# Patient Record
Sex: FEMALE | Race: WHITE | NOT HISPANIC OR LATINO | Employment: OTHER | URBAN - METROPOLITAN AREA
[De-identification: names, ages, dates, MRNs, and addresses within clinical notes are randomized per-mention and may not be internally consistent; named-entity substitution may affect disease eponyms.]

---

## 2017-03-06 ENCOUNTER — OFFICE VISIT (OUTPATIENT)
Dept: FAMILY MEDICINE | Facility: CLINIC | Age: 72
End: 2017-03-06
Payer: MEDICARE

## 2017-03-06 VITALS
TEMPERATURE: 97.3 F | WEIGHT: 130.8 LBS | SYSTOLIC BLOOD PRESSURE: 130 MMHG | HEIGHT: 62 IN | HEART RATE: 72 BPM | BODY MASS INDEX: 24.07 KG/M2 | DIASTOLIC BLOOD PRESSURE: 78 MMHG

## 2017-03-06 DIAGNOSIS — F32.5 MAJOR DEPRESSION IN COMPLETE REMISSION (H): Primary | ICD-10-CM

## 2017-03-06 DIAGNOSIS — Z71.89 COUNSELING ON HEALTH CARE DIRECTIVE: ICD-10-CM

## 2017-03-06 DIAGNOSIS — H91.90 HEARING LOSS, UNSPECIFIED LATERALITY: ICD-10-CM

## 2017-03-06 PROCEDURE — 99214 OFFICE O/P EST MOD 30 MIN: CPT | Performed by: PHYSICIAN ASSISTANT

## 2017-03-06 RX ORDER — ESCITALOPRAM OXALATE 10 MG/1
10 TABLET ORAL DAILY
Qty: 90 TABLET | Refills: 3 | Status: SHIPPED | OUTPATIENT
Start: 2017-03-06 | End: 2017-05-09

## 2017-03-06 ASSESSMENT — ANXIETY QUESTIONNAIRES
5. BEING SO RESTLESS THAT IT IS HARD TO SIT STILL: NOT AT ALL
GAD7 TOTAL SCORE: 0
2. NOT BEING ABLE TO STOP OR CONTROL WORRYING: NOT AT ALL
7. FEELING AFRAID AS IF SOMETHING AWFUL MIGHT HAPPEN: NOT AT ALL
1. FEELING NERVOUS, ANXIOUS, OR ON EDGE: NOT AT ALL
6. BECOMING EASILY ANNOYED OR IRRITABLE: NOT AT ALL
3. WORRYING TOO MUCH ABOUT DIFFERENT THINGS: NOT AT ALL

## 2017-03-06 ASSESSMENT — PATIENT HEALTH QUESTIONNAIRE - PHQ9: 5. POOR APPETITE OR OVEREATING: NOT AT ALL

## 2017-03-06 NOTE — PROGRESS NOTES
"  SUBJECTIVE:                                                    Isabella Acosta is a 72 year old female who presents to clinic today for the following health issues:      Depression Followup    Status since last visit: Stable     See PHQ-9 for current symptoms.  Other associated symptoms: None    Complicating factors:   Significant life event:  Yes-  Niece  of cancer, just had an abscessed tooth pulled   Current substance abuse:  None  Anxiety or Panic symptoms:  No    PHQ-9  English PHQ-9   Any Language            Amount of exercise or physical activity: 2-3 days/week for an average of 15-30 minutes    Problems taking medications regularly: No    Medication side effects: none  Diet: regular (no restrictions)    On lexapro 20 mg for years, other SSRI prior.  Has tried off med in past and does not ever want to try going off med again.    Still does not want any cancer or preventative health screenings.  Intolerant of asa.    Is frustrated by poor hearing x 6 mo or so.    Problem list and histories reviewed & adjusted, as indicated.  Additional history: as documented    BP Readings from Last 3 Encounters:   17 130/78   12/01/15 136/70   11/02/15 146/79    Wt Readings from Last 3 Encounters:   17 130 lb 12.8 oz (59.3 kg)   12/01/15 129 lb (58.5 kg)   09/22/15 130 lb 9.6 oz (59.2 kg)            Labs reviewed in EPIC    Reviewed and updated as needed this visit by clinical staff       Reviewed and updated as needed this visit by Provider         ROS:  Constitutional, HEENT, psych systems are negative, except as otherwise noted.    OBJECTIVE:                                                    /78 (BP Location: Right arm, Patient Position: Chair, Cuff Size: Adult Regular)  Pulse 72  Temp 97.3  F (36.3  C) (Tympanic)  Ht 5' 2.25\" (1.581 m)  Wt 130 lb 12.8 oz (59.3 kg)  BMI 23.73 kg/m2  Body mass index is 23.73 kg/(m^2).  GENERAL: healthy, alert and no distress  PSYCH: mentation appears normal, affect " normal/bright  HEENT: TMs and canals normal liv    PHQ-9 SCORE 4/3/2014 9/22/2015 3/6/2017   Total Score 4 - -   Total Score - 3 0     MARYCRUZ-7 SCORE 4/3/2014 9/22/2015 3/6/2017   Total Score 0 - -   Total Score - 0 0        ASSESSMENT/PLAN:                                                        ICD-10-CM    1. Major depression in complete remission (H) F32.5 escitalopram (LEXAPRO) 10 MG tablet   2. Counseling on health care directive Z71.89    3. Hearing loss, unspecified laterality H91.90 AUDIOLOGY ADULT REFERRAL       Patient Instructions   Try dose decrease - more appropriate for age    Attend class to set up health directive    Hearing referral      Ssuana aTylor PA-C  [facility]

## 2017-03-06 NOTE — MR AVS SNAPSHOT
"              After Visit Summary   3/6/2017    Isabella Acosta    MRN: [id number]           Patient Information     Date Of Birth          1945        Visit Information        Provider Department      3/6/2017 1:00 PM Susana Taylor PA-C [facility]        Today's Diagnoses     Major depression in complete remission (H)    -  1    Counseling on health care directive        Hearing loss, unspecified laterality          Care Instructions    Try dose decrease - more appropriate for age    Attend class to set up health directive    Hearing referral        Follow-ups after your visit        Additional Services     AUDIOLOGY ADULT REFERRAL       Your provider has referred you to: [facility] [phone]   [email or web address]    Specialty Testing:  eval and treat for hearing loss                  Who to contact     If you have questions or need follow up information about today's clinic visit or your schedule please contact [facility] directly at [phone].  Normal or non-critical lab and imaging results will be communicated to you by [employer], letter or phone within 4 business days after the clinic has received the results. If you do not hear from us within 7 days, please contact the clinic through [employer] or phone. If you have a critical or abnormal lab result, we will notify you by phone as soon as possible.  Submit refill requests through [employer] or call your pharmacy and they will forward the refill request to us. Please allow 3 business days for your refill to be completed.          Additional Information About Your Visit        MyChart Information     [employer] lets you send messages to your doctor, view your test results, renew your prescriptions, schedule appointments and more. To sign up, go to [email or web address] . Click on \"Log in\" on the left side of the screen, which will take you to the Welcome " "page. Then click on \"Sign up Now\" on the right side of the page.     You will be asked to enter the access code listed below, as well as some personal information. Please follow the directions to create your username and password.     Your access code is: [email or web address]  Expires: 2017  1:37 PM     Your access code will  in 90 days. If you need help or a new code, please call your [facility] clinic or [phone].        Care EveryWhere ID     This is your Care EveryWhere ID. This could be used by other organizations to access your [facility] medical records  [id number]        Your Vitals Were     Pulse Temperature Height BMI (Body Mass Index)          72 97.3  F (36.3  C) (Tympanic) 5' 2.25\" (1.581 m) 23.73 kg/m2         Blood Pressure from Last 3 Encounters:   17 130/78   12/01/15 136/70   11/02/15 146/79    Weight from Last 3 Encounters:   17 130 lb 12.8 oz (59.3 kg)   12/01/15 129 lb (58.5 kg)   09/22/15 130 lb 9.6 oz (59.2 kg)              We Performed the Following     AUDIOLOGY ADULT REFERRAL          Today's Medication Changes          These changes are accurate as of: 3/6/17  1:37 PM.  If you have any questions, ask your nurse or doctor.               These medicines have changed or have updated prescriptions.        Dose/Directions    escitalopram 10 MG tablet   Commonly known as:  LEXAPRO   This may have changed:    - medication strength  - how much to take   Used for:  Major depression in complete remission (H)   Changed by:  Susana Taylor PA-C        Dose:  10 mg   Take 1 tablet (10 mg) by mouth daily   Quantity:  90 tablet   Refills:  3            Where to get your medicines      These medications were sent to [id number] - [place] [address]  [address] [address]    Hours:  Closed 10-16-08 business to [facility] Phone:  [phone]     escitalopram 10 MG tablet                Primary Care Provider Office Phone # Fax #    " Lydia Ramso -[phone]       [facility] [address] [facility] [address]        Thank you!     Thank you for choosing [facility]  for your care. Our goal is always to provide you with excellent care. Hearing back from our patients is one way we can continue to improve our services. Please take a few minutes to complete the written survey that you may receive in the mail after your visit with us. Thank you!             Your Updated Medication List - Protect others around you: Learn how to safely use, store and throw away your medicines at www.disposemymeds.org.          This list is accurate as of: 3/6/17  1:37 PM.  Always use your most recent med list.                   Brand Name Dispense Instructions for use    escitalopram 10 MG tablet    LEXAPRO    90 tablet    Take 1 tablet (10 mg) by mouth daily       melatonin 3 MG tablet      Take 6 mg by mouth nightly as needed for sleep       PRILOSEC OTC PO      1 daily

## 2017-03-06 NOTE — PATIENT INSTRUCTIONS
Try dose decrease - more appropriate for age    Attend class to set up health directive    Hearing referral

## 2017-03-06 NOTE — NURSING NOTE
"Chief Complaint   Patient presents with     Depression       Initial /78 (BP Location: Right arm, Patient Position: Chair, Cuff Size: Adult Regular)  Pulse 72  Temp 97.3  F (36.3  C) (Tympanic)  Ht 5' 2.25\" (1.581 m)  Wt 130 lb 12.8 oz (59.3 kg)  BMI 23.73 kg/m2 Estimated body mass index is 23.73 kg/(m^2) as calculated from the following:    Height as of this encounter: 5' 2.25\" (1.581 m).    Weight as of this encounter: 130 lb 12.8 oz (59.3 kg).  Medication Reconciliation: complete    Health Maintenance that is potentially due pending provider review:  Mammogram    Luiza HUBBARD MA        "

## 2017-03-07 ASSESSMENT — PATIENT HEALTH QUESTIONNAIRE - PHQ9: SUM OF ALL RESPONSES TO PHQ QUESTIONS 1-9: 0

## 2017-03-07 ASSESSMENT — ANXIETY QUESTIONNAIRES: GAD7 TOTAL SCORE: 0

## 2017-05-09 ENCOUNTER — TELEPHONE (OUTPATIENT)
Dept: FAMILY MEDICINE | Facility: CLINIC | Age: 72
End: 2017-05-09

## 2017-05-09 DIAGNOSIS — F32.1 MAJOR DEPRESSIVE DISORDER, SINGLE EPISODE, MODERATE (H): ICD-10-CM

## 2017-05-09 DIAGNOSIS — F32.5 MAJOR DEPRESSION IN COMPLETE REMISSION (H): ICD-10-CM

## 2017-05-09 RX ORDER — ESCITALOPRAM OXALATE 10 MG/1
10 TABLET ORAL DAILY
Qty: 90 TABLET | Refills: 0 | Status: SHIPPED | OUTPATIENT
Start: 2017-05-09 | End: 2017-05-10 | Stop reason: DRUGHIGH

## 2017-05-09 NOTE — TELEPHONE ENCOUNTER
Lexapro       Last Written Prescription Date: 3/6/17  Last Fill Quantity: 90; # refills: 3  Last Office Visit with G, P or [facility] prescribing provider:  3/6/17          Last PHQ-9 score on record=   PHQ-9 SCORE 3/6/2017   Total Score -   Total Score 0       No results found for: AST  Lab Results   Component Value Date    ALT 19 12/28/2009   Patient is calling to see if Susana WARRENTasia Will up her dose to 20 mg, which is what she was before, she lowered it. She can definitely tell the difference. She uses [facility] in [place].  [facility]  [facility] [place] [facility]

## 2017-05-10 RX ORDER — ESCITALOPRAM OXALATE 20 MG/1
20 TABLET ORAL DAILY
Qty: 30 TABLET | Refills: 0 | Status: SHIPPED | OUTPATIENT
Start: 2017-05-10 | End: 2017-06-01

## 2017-05-10 NOTE — TELEPHONE ENCOUNTER
Official labeling on this med is for 10 mg in the over age 65 population.    It also has some interactions with omeprazole, which increases risk for lexapro to stop her heart (long QT).  If she still really wants to go back on the higher dose, then needs an EKG within 1-2 weeks of restarting higher dose.  Can make an appt for that.  Send in 1 mo higher dose if she wants back on it.  Further refills after EKG.

## 2017-06-01 ENCOUNTER — ALLIED HEALTH/NURSE VISIT (OUTPATIENT)
Dept: FAMILY MEDICINE | Facility: CLINIC | Age: 72
End: 2017-06-01
Payer: MEDICARE

## 2017-06-01 DIAGNOSIS — F32.1 MAJOR DEPRESSIVE DISORDER, SINGLE EPISODE, MODERATE (H): ICD-10-CM

## 2017-06-01 DIAGNOSIS — F32.5 MAJOR DEPRESSION IN COMPLETE REMISSION (H): ICD-10-CM

## 2017-06-01 DIAGNOSIS — F32.1 MODERATE MAJOR DEPRESSION (H): Primary | ICD-10-CM

## 2017-06-01 PROCEDURE — 99207 ZZC NO CHARGE NURSE ONLY: CPT

## 2017-06-01 PROCEDURE — 93000 ELECTROCARDIOGRAM COMPLETE: CPT

## 2017-06-01 RX ORDER — ESCITALOPRAM OXALATE 20 MG/1
20 TABLET ORAL DAILY
Qty: 30 TABLET | Refills: 5 | Status: SHIPPED | OUTPATIENT
Start: 2017-06-01 | End: 2017-12-12

## 2017-12-12 DIAGNOSIS — F32.5 MAJOR DEPRESSION IN COMPLETE REMISSION (H): ICD-10-CM

## 2017-12-12 DIAGNOSIS — F32.1 MAJOR DEPRESSIVE DISORDER, SINGLE EPISODE, MODERATE (H): ICD-10-CM

## 2017-12-13 RX ORDER — ESCITALOPRAM OXALATE 20 MG/1
TABLET ORAL
Qty: 30 TABLET | Refills: 0 | Status: SHIPPED | OUTPATIENT
Start: 2017-12-13 | End: 2018-01-11

## 2018-01-11 ENCOUNTER — OFFICE VISIT (OUTPATIENT)
Dept: FAMILY MEDICINE | Facility: CLINIC | Age: 73
End: 2018-01-11
Payer: MEDICARE

## 2018-01-11 VITALS
BODY MASS INDEX: 24.29 KG/M2 | HEIGHT: 62 IN | TEMPERATURE: 97.7 F | WEIGHT: 132 LBS | DIASTOLIC BLOOD PRESSURE: 82 MMHG | HEART RATE: 72 BPM | SYSTOLIC BLOOD PRESSURE: 138 MMHG

## 2018-01-11 DIAGNOSIS — F33.0 MILD RECURRENT MAJOR DEPRESSION (H): ICD-10-CM

## 2018-01-11 DIAGNOSIS — Z78.9 HEALTH MAINTENANCE ALTERATION: Primary | ICD-10-CM

## 2018-01-11 PROCEDURE — 99213 OFFICE O/P EST LOW 20 MIN: CPT | Performed by: PHYSICIAN ASSISTANT

## 2018-01-11 RX ORDER — ESCITALOPRAM OXALATE 20 MG/1
20 TABLET ORAL DAILY
Qty: 90 TABLET | Refills: 3 | Status: SHIPPED | OUTPATIENT
Start: 2018-01-11 | End: 2019-01-14

## 2018-01-11 ASSESSMENT — ANXIETY QUESTIONNAIRES
2. NOT BEING ABLE TO STOP OR CONTROL WORRYING: SEVERAL DAYS
GAD7 TOTAL SCORE: 7
GAD7 TOTAL SCORE: 7
5. BEING SO RESTLESS THAT IT IS HARD TO SIT STILL: SEVERAL DAYS
6. BECOMING EASILY ANNOYED OR IRRITABLE: SEVERAL DAYS
GAD7 TOTAL SCORE: 7
1. FEELING NERVOUS, ANXIOUS, OR ON EDGE: SEVERAL DAYS
4. TROUBLE RELAXING: SEVERAL DAYS
7. FEELING AFRAID AS IF SOMETHING AWFUL MIGHT HAPPEN: SEVERAL DAYS
7. FEELING AFRAID AS IF SOMETHING AWFUL MIGHT HAPPEN: SEVERAL DAYS
3. WORRYING TOO MUCH ABOUT DIFFERENT THINGS: SEVERAL DAYS

## 2018-01-11 ASSESSMENT — PATIENT HEALTH QUESTIONNAIRE - PHQ9
SUM OF ALL RESPONSES TO PHQ QUESTIONS 1-9: 9
SUM OF ALL RESPONSES TO PHQ QUESTIONS 1-9: 9

## 2018-01-11 NOTE — NURSING NOTE
"Chief Complaint   Patient presents with     Depression       Initial /76 (BP Location: Right arm, Patient Position: Chair, Cuff Size: Adult Regular)  Pulse 72  Temp 97.7  F (36.5  C) (Tympanic)  Ht 5' 2.25\" (1.581 m)  Wt 132 lb (59.9 kg)  BMI 23.95 kg/m2 Estimated body mass index is 23.95 kg/(m^2) as calculated from the following:    Height as of this encounter: 5' 2.25\" (1.581 m).    Weight as of this encounter: 132 lb (59.9 kg).  Medication Reconciliation: complete    Health Maintenance that is potentially due pending provider review:  NONE        Is there anyone who you would like to be able to receive your results? No  If yes have patient fill out JASMYN      "

## 2018-01-11 NOTE — PROGRESS NOTES
"  SUBJECTIVE:   Isabella Acosta is a 72 year old female who presents to clinic today for the following health issues:      Depression Followup    Status since last visit: Stable     See PHQ-9 for current symptoms.  Other associated symptoms: None    Complicating factors:   Significant life event:  Yes-  Brother with esophageal cancer-he's doing better   Current substance abuse:  None  Anxiety or Panic symptoms:  No    PHQ-9 Score and MyChart F/U Questions 9/22/2015 3/6/2017 1/11/2018   Total Score 3 0 9   Q9: Suicide Ideation Not at all Not at all Several days   F/U: Thoughts of suicide or self harm? - - No   F/U: Safety concerns for self or others? - - No     PHQ-9  English  PHQ-9   Any Language  Suicide Assessment Five-step Evaluation and Treatment (SAFE-T)      Amount of exercise or physical activity: 4-5 days/week for an average of 30-45 minutes    Problems taking medications regularly: No    Medication side effects: none    Diet: regular (no restrictions)    She feels med is \"doing what it needs to do\", \"I can tell its working.\"  She continues to not want to try off or at decreased dosage.  EKG was normal June 2017.  She admits ga are much worse and that she felt better when in [place] for a few weeks.  She has no thoughts of self harm.  She continues to have no interest in further vaccination, including influenza, or in any cancer or other screenings.  She is intolerant of aspirin.    Problem list and histories reviewed & adjusted, as indicated.  Additional history: as documented    BP Readings from Last 3 Encounters:   01/11/18 150/76   03/06/17 130/78   12/01/15 136/70    Wt Readings from Last 3 Encounters:   01/11/18 132 lb (59.9 kg)   03/06/17 130 lb 12.8 oz (59.3 kg)   12/01/15 129 lb (58.5 kg)         Labs reviewed in EPIC      Reviewed and updated as needed this visit by clinical staff     Reviewed and updated as needed this visit by Provider         ROS:  Constitutional, CV, and psych systems are " "negative, except as otherwise noted.    OBJECTIVE:   /76 (BP Location: Right arm, Patient Position: Chair, Cuff Size: Adult Regular)  Pulse 72  Temp 97.7  F (36.5  C) (Tympanic)  Ht 5' 2.25\" (1.581 m)  Wt 132 lb (59.9 kg)  BMI 23.95 kg/m2  Body mass index is 23.95 kg/(m^2).  GENERAL: healthy, alert and no distress  CV: regular rate and rhythm, normal S1 S2, no S3 or S4, no murmur, click or rub, no peripheral edema   PSYCH: mentation appears normal, affect normal/bright    PHQ-9 SCORE 9/22/2015 3/6/2017 1/11/2018   Total Score - - -   Total Score MyChart - - 9 (Mild depression)   Total Score 3 0 9     MARYCRUZ-7 SCORE 9/22/2015 3/6/2017 1/11/2018   Total Score - - -   Total Score - - 7 (mild anxiety)   Total Score 0 0 7     ASSESSMENT/PLAN:       ICD-10-CM    1. Health maintenance alteration Z78.9    2. Mild recurrent major depression (H) F33.0 escitalopram (LEXAPRO) 20 MG tablet   20 min spent with patient, over half in discussion of health maintenance options    Patient Instructions   Continuing lexapro  Consider vitamin D supplement - or better yet, lab to check  Phototherapy 10,000 lux, 20-30 min every morning    Could consider adding wellbutrin if wanted to try to get things better          Susana Taylor PARIANA  [facility]  "

## 2018-01-11 NOTE — MR AVS SNAPSHOT
"              After Visit Summary   1/11/2018    Isabella Acosta    MRN: [id number]           Patient Information     Date Of Birth          1945        Visit Information        Provider Department      1/11/2018 1:40 PM Susana Taylor PA-C [facility]        Today's Diagnoses     Major depressive disorder, single episode, moderate (H)          Care Instructions    Continuing lexapro  Consider vitamin D supplement - or better yet, lab to check  Phototherapy 10,000 lux, 20-30 min every morning    Could consider adding wellbutrin if wanted to try to get things better              Follow-ups after your visit        Who to contact     If you have questions or need follow up information about today's clinic visit or your schedule please contact [facility] directly at [phone].  Normal or non-critical lab and imaging results will be communicated to you by MyChart, letter or phone within 4 business days after the clinic has received the results. If you do not hear from us within 7 days, please contact the clinic through MyChart or phone. If you have a critical or abnormal lab result, we will notify you by phone as soon as possible.  Submit refill requests through [email or web address] or call your pharmacy and they will forward the refill request to us. Please allow 3 business days for your refill to be completed.          Additional Information About Your Visit        MyChart Information     [email or web address] lets you send messages to your doctor, view your test results, renew your prescriptions, schedule appointments and more. To sign up, go to [email or web address] . Click on \"Log in\" on the left side of the screen, which will take you to the Welcome page. Then click on \"Sign up Now\" on the right side of the page.     You will be asked to enter the access code listed below, as well as some personal information. Please follow the directions to create your username and password.     Your " "access code is: [email or web address]  Expires: 2018  2:24 PM     Your access code will  in 90 days. If you need help or a new code, please call your [facility] or [phone].        Care EveryWhere ID     This is your Care EveryWhere ID. This could be used by other organizations to access your [facility] medical records  [id number]        Your Vitals Were     Pulse Temperature Height BMI (Body Mass Index)          72 97.7  F (36.5  C) (Tympanic) 5' 2.25\" (1.581 m) 23.95 kg/m2         Blood Pressure from Last 3 Encounters:   18 150/76   17 130/78   12/01/15 136/70    Weight from Last 3 Encounters:   18 132 lb (59.9 kg)   17 130 lb 12.8 oz (59.3 kg)   12/01/15 129 lb (58.5 kg)              Today, you had the following     No orders found for display         Today's Medication Changes          These changes are accurate as of: 18  2:24 PM.  If you have any questions, ask your nurse or doctor.               These medicines have changed or have updated prescriptions.        Dose/Directions    escitalopram 20 MG tablet   Commonly known as:  LEXAPRO   This may have changed:  See the new instructions.   Used for:  Major depressive disorder, single episode, moderate (H)   Changed by:  Susana Taylor PA-C        Dose:  20 mg   Take 1 tablet (20 mg) by mouth daily   Quantity:  90 tablet   Refills:  3            Where to get your medicines      These medications were sent to [id number] [address] [address]  [address] [place] [address]    Hours:  Closed 10-16-08 business to [facility] Phone:  [phone]     escitalopram 20 MG tablet                Primary Care Provider Office Phone # Fax #    Susana Taylor PA-C [phone] [address] [facility] [address]        Equal Access to Services     [facility] HospitalNOEMI AH: Hadii sandra burkso Sojoy, waaxda luqadaha, qaybta charlie zhou" valorie faustin ah. So [facility] [phone].    ATENCIÓN: Si habla denisse, tiene a wallace disposición servicios gratuitos de asistencia lingüística. Dameon al [phone].    We comply with applicable federal civil rights laws and [place] laws. We do not discriminate on the basis of race, color, national origin, age, disability, sex, sexual orientation, or gender identity.            Thank you!     Thank you for choosing [facility]  for your care. Our goal is always to provide you with excellent care. Hearing back from our patients is one way we can continue to improve our services. Please take a few minutes to complete the written survey that you may receive in the mail after your visit with us. Thank you!             Your Updated Medication List - Protect others around you: Learn how to safely use, store and throw away your medicines at www.disposemymeds.org.          This list is accurate as of: 1/11/18  2:24 PM.  Always use your most recent med list.                   Brand Name Dispense Instructions for use Diagnosis    escitalopram 20 MG tablet    LEXAPRO    90 tablet    Take 1 tablet (20 mg) by mouth daily    Major depressive disorder, single episode, moderate (H)       melatonin 3 MG tablet      Take 6 mg by mouth nightly as needed for sleep        PRILOSEC OTC PO      1 daily    GERD (gastroesophageal reflux disease)

## 2018-01-11 NOTE — PATIENT INSTRUCTIONS
Continuing lexapro  Consider vitamin D supplement - or better yet, lab to check  Phototherapy 10,000 lux, 20-30 min every morning    Could consider adding wellbutrin if wanted to try to get things better

## 2018-01-12 ASSESSMENT — ANXIETY QUESTIONNAIRES: GAD7 TOTAL SCORE: 7

## 2018-01-12 ASSESSMENT — PATIENT HEALTH QUESTIONNAIRE - PHQ9: SUM OF ALL RESPONSES TO PHQ QUESTIONS 1-9: 9

## 2019-01-14 DIAGNOSIS — F33.0 MILD RECURRENT MAJOR DEPRESSION (H): ICD-10-CM

## 2019-01-14 RX ORDER — ESCITALOPRAM OXALATE 20 MG/1
TABLET ORAL
Qty: 30 TABLET | Refills: 0 | Status: SHIPPED | OUTPATIENT
Start: 2019-01-14 | End: 2020-02-24

## 2019-01-14 NOTE — TELEPHONE ENCOUNTER
"Requested Prescriptions   Pending Prescriptions Disp Refills     escitalopram (LEXAPRO) 20 MG tablet [Pharmacy Med Name: ESCITALOPRAM 20 MG  TAB TEVA] 90 tablet 2     Sig: TAKE ONE TABLET BY MOUTH DAILY    SSRIs Protocol Failed - 1/14/2019 10:01 AM       Failed - PHQ-9 score less than 5 in past 6 months    Please review last PHQ-9 score.          Failed - Recent (6 mo) or future (30 days) visit within the authorizing provider's specialty    Patient had office visit in the last 6 months or has a visit in the next 30 days with authorizing provider or within the authorizing provider's specialty.  See \"Patient Info\" tab in inbasket, or \"Choose Columns\" in Meds & Orders section of the refill encounter.           Passed - Medication is active on med list       Passed - Patient is age 18 or older       Passed - No active pregnancy on record       Passed - No positive pregnancy test in last 12 months          "

## 2020-02-24 ENCOUNTER — OFFICE VISIT (OUTPATIENT)
Dept: FAMILY MEDICINE | Facility: CLINIC | Age: 75
End: 2020-02-24
Payer: MEDICARE

## 2020-02-24 VITALS
TEMPERATURE: 98.2 F | OXYGEN SATURATION: 100 % | SYSTOLIC BLOOD PRESSURE: 154 MMHG | WEIGHT: 125 LBS | DIASTOLIC BLOOD PRESSURE: 80 MMHG | BODY MASS INDEX: 22.68 KG/M2 | HEART RATE: 75 BPM | RESPIRATION RATE: 14 BRPM

## 2020-02-24 DIAGNOSIS — R03.0 ELEVATED BP WITHOUT DIAGNOSIS OF HYPERTENSION: ICD-10-CM

## 2020-02-24 DIAGNOSIS — F33.0 MILD RECURRENT MAJOR DEPRESSION (H): Primary | ICD-10-CM

## 2020-02-24 PROCEDURE — 99214 OFFICE O/P EST MOD 30 MIN: CPT | Performed by: PHYSICIAN ASSISTANT

## 2020-02-24 RX ORDER — ESCITALOPRAM OXALATE 20 MG/1
10 TABLET ORAL DAILY
Qty: 30 TABLET | Refills: 1 | Status: SHIPPED | OUTPATIENT
Start: 2020-02-24 | End: 2020-04-15

## 2020-02-24 ASSESSMENT — ANXIETY QUESTIONNAIRES
6. BECOMING EASILY ANNOYED OR IRRITABLE: NEARLY EVERY DAY
3. WORRYING TOO MUCH ABOUT DIFFERENT THINGS: MORE THAN HALF THE DAYS
2. NOT BEING ABLE TO STOP OR CONTROL WORRYING: SEVERAL DAYS
GAD7 TOTAL SCORE: 11
1. FEELING NERVOUS, ANXIOUS, OR ON EDGE: MORE THAN HALF THE DAYS
5. BEING SO RESTLESS THAT IT IS HARD TO SIT STILL: SEVERAL DAYS
7. FEELING AFRAID AS IF SOMETHING AWFUL MIGHT HAPPEN: SEVERAL DAYS

## 2020-02-24 ASSESSMENT — PATIENT HEALTH QUESTIONNAIRE - PHQ9
SUM OF ALL RESPONSES TO PHQ QUESTIONS 1-9: 13
5. POOR APPETITE OR OVEREATING: SEVERAL DAYS

## 2020-02-24 NOTE — PROGRESS NOTES
Subjective     Isabella Acosta is a 75 year old female who presents to clinic today for the following health issues:    HPI   Abnormal Mood Symptoms      Duration: Ongoing, last visit was 1/11/2018.  Has been a year since she has been on medication    Description:  Depression: YES  Anxiety: YES  Panic attacks: YES     Accompanying signs and symptoms: see PHQ-9 and MARYCRUZ scores    History (similar episodes/previous evaluation): Was previously on medication.  Significant life event: Lost another brother to cancer.  Son dx with genetic psoriatic arthritis.  Recently applied for an apartment in [facility] in [place]. Is now put on a wait list, upset about it.      Precipitating or alleviating factors: None    Therapies tried and outcome: Lexapro (Escitalopram) 20mg    Ran out of med Jan 2019.    First few months were not good, maybe improved after.  Has lived with daughter for 11 yrs.  Does fine with daughter but struggles with her daughter's partner.  Just got letter today about being on wait list for other housing.  Taking benadryl for sleep.  Very worried about her son going on cosentyx.    Has been monitoring BPs, running a bit high, but she is taking these immediately after exercise.      BP Readings from Last 3 Encounters:   02/24/20 (!) 154/80   01/11/18 138/82   03/06/17 130/78    Wt Readings from Last 3 Encounters:   02/24/20 56.7 kg (125 lb)   01/11/18 59.9 kg (132 lb)   03/06/17 59.3 kg (130 lb 12.8 oz)         Reviewed and updated as needed this visit by Provider  Tobacco  Allergies  Meds  Problems  Med Hx  Surg Hx  Fam Hx         Review of Systems   ROS COMP: Constitutional, cardiovascular, pulmonary, psych systems are negative, except as otherwise noted.      Objective    BP (!) 154/80   Pulse 75   Temp 98.2  F (36.8  C) (Tympanic)   Resp 14   Wt 56.7 kg (125 lb)   SpO2 100%   BMI 22.68 kg/m    Body mass index is 22.68 kg/m .  Physical Exam   GENERAL: healthy, alert and no distress  CV:  regular rate and rhythm, normal S1 S2, no S3 or S4, no murmur, click or rub, no peripheral edema and peripheral pulses strong  PSYCH: mentation appears normal, tearful, judgement and insight intact and appearance well groomed    Diagnostic Test Results:  Labs reviewed in Epic        Assessment & Plan       ICD-10-CM    1. 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 Elevated BP without diagnosis of hypertension R03.0         Patient Instructions   Restart medication   Can self increase lexapro to 1 full tab at 3-4 weeks if feel like you're on the right path.  Recheck in 1-2 months.  See me if worse.    Recheck BPs when sitting for 5 min, not when exercising.  Call me if BPs running really high - 150s or higher - as I would add med.      Return in about 8 weeks (around 4/20/2020).    Susana Taylor PA-C  [facility]

## 2020-02-24 NOTE — NURSING NOTE
"Chief Complaint   Patient presents with     MOOD CHANGES       Initial BP (!) 194/90 (BP Location: Right arm, Patient Position: Chair, Cuff Size: Adult Regular)   Pulse 75   Temp 98.2  F (36.8  C) (Tympanic)   Resp 14   Wt 56.7 kg (125 lb)   SpO2 100%   BMI 22.68 kg/m   Estimated body mass index is 22.68 kg/m  as calculated from the following:    Height as of 1/11/18: 1.581 m (5' 2.25\").    Weight as of this encounter: 56.7 kg (125 lb).    Patient presents to the clinic using No DME    Health Maintenance that is potentially due pending provider review:  NONE        Is there anyone who you would like to be able to receive your results? No  If yes have patient fill out [email or web address]        "

## 2020-02-24 NOTE — PATIENT INSTRUCTIONS
Restart medication   Can self increase lexapro to 1 full tab at 3-4 weeks if feel like you're on the right path.  Recheck in 1-2 months.  See me if worse.    Recheck BPs when sitting for 5 min, not when exercising.  Call me if BPs running really high - 150s or higher - as I would add med.

## 2020-02-25 ASSESSMENT — ANXIETY QUESTIONNAIRES: GAD7 TOTAL SCORE: 11

## 2020-04-15 ENCOUNTER — VIRTUAL VISIT (OUTPATIENT)
Dept: FAMILY MEDICINE | Facility: CLINIC | Age: 75
End: 2020-04-15
Payer: MEDICARE

## 2020-04-15 DIAGNOSIS — R03.0 ELEVATED BP WITHOUT DIAGNOSIS OF HYPERTENSION: ICD-10-CM

## 2020-04-15 DIAGNOSIS — F33.0 MILD RECURRENT MAJOR DEPRESSION (H): Primary | ICD-10-CM

## 2020-04-15 DIAGNOSIS — G47.00 INSOMNIA, UNSPECIFIED TYPE: ICD-10-CM

## 2020-04-15 PROCEDURE — 99442 ZZC PHYSICIAN TELEPHONE EVALUATION 11-20 MIN: CPT | Performed by: PHYSICIAN ASSISTANT

## 2020-04-15 RX ORDER — ESCITALOPRAM OXALATE 20 MG/1
20 TABLET ORAL DAILY
Qty: 30 TABLET | Refills: 11 | Status: SHIPPED | OUTPATIENT
Start: 2020-04-15 | End: 2021-04-15

## 2020-04-15 ASSESSMENT — PATIENT HEALTH QUESTIONNAIRE - PHQ9
SUM OF ALL RESPONSES TO PHQ QUESTIONS 1-9: 3
5. POOR APPETITE OR OVEREATING: NOT AT ALL

## 2020-04-15 ASSESSMENT — ANXIETY QUESTIONNAIRES
IF YOU CHECKED OFF ANY PROBLEMS ON THIS QUESTIONNAIRE, HOW DIFFICULT HAVE THESE PROBLEMS MADE IT FOR YOU TO DO YOUR WORK, TAKE CARE OF THINGS AT HOME, OR GET ALONG WITH OTHER PEOPLE: SOMEWHAT DIFFICULT
7. FEELING AFRAID AS IF SOMETHING AWFUL MIGHT HAPPEN: SEVERAL DAYS
GAD7 TOTAL SCORE: 3
5. BEING SO RESTLESS THAT IT IS HARD TO SIT STILL: NOT AT ALL
1. FEELING NERVOUS, ANXIOUS, OR ON EDGE: SEVERAL DAYS
3. WORRYING TOO MUCH ABOUT DIFFERENT THINGS: NOT AT ALL
2. NOT BEING ABLE TO STOP OR CONTROL WORRYING: NOT AT ALL
6. BECOMING EASILY ANNOYED OR IRRITABLE: SEVERAL DAYS

## 2020-04-15 NOTE — PROGRESS NOTES
"Isabella Acosta is a 75 year old female who is being evaluated via a billable telephone visit.      The patient has been notified of following:     \"This telephone visit will be conducted via a call between you and your physician/provider. We have found that certain health care needs can be provided without the need for a physical exam.  This service lets us provide the care you need with a short phone conversation.  If a prescription is necessary we can send it directly to your pharmacy.  If lab work is needed we can place an order for that and you can then stop by our lab to have the test done at a later time.    Telephone visits are billed at different rates depending on your insurance coverage. During this emergency period, for some insurers they may be billed the same as an in-person visit.  Please reach out to your insurance provider with any questions.    If during the course of the call the physician/provider feels a telephone visit is not appropriate, you will not be charged for this service.\"    Patient has given verbal consent for Telephone visit?  Yes    How would you like to obtain your AVS? Mail a copy    Subjective     Isabella Acosta is a 75 year old female who presents to clinic today for the following health issues:    Depression and Anxiety Follow-Up    How are you doing with your depression since your last visit? Improved     How are you doing with your anxiety since your last visit?  Improved     Are you having other symptoms that might be associated with depression or anxiety? No    Have you had a significant life event? No     Do you have any concerns with your use of alcohol or other drugs? No    Social History     Tobacco Use     Smoking status: Never Smoker     Smokeless tobacco: Never Used   Substance Use Topics     Alcohol use: No     Comment: recovering alcoholic since 1985     Drug use: No     PHQ 1/11/2018 2/24/2020 4/15/2020   PHQ-9 Total Score 9 13 3   Q9: Thoughts of better off " "dead/self-harm past 2 weeks Several days Not at all Not at all     MARYCRUZ-7 SCORE 1/11/2018 2/24/2020 4/15/2020   Total Score - - -   Total Score 7 (mild anxiety) - -   Total Score 7 11 3     Last PHQ-9 4/15/2020   1.  Little interest or pleasure in doing things 1   2.  Feeling down, depressed, or hopeless 1   3.  Trouble falling or staying asleep, or sleeping too much 0   4.  Feeling tired or having little energy 0   5.  Poor appetite or overeating 0   6.  Feeling bad about yourself 1   7.  Trouble concentrating 0   8.  Moving slowly or restless 0   Q9: Thoughts of better off dead/self-harm past 2 weeks 0   PHQ-9 Total Score 3   Difficulty at work, home, or with people Not difficult at all     MARYCRUZ-7  4/15/2020   1. Feeling nervous, anxious, or on edge 1   2. Not being able to stop or control worrying 0   3. Worrying too much about different things 0   4. Trouble relaxing 0   5. Being so restless that it is hard to sit still 0   6. Becoming easily annoyed or irritable 1   7. Feeling afraid, as if something awful might happen 1   MARYCRUZ-7 Total Score 3   If you checked any problems, how difficult have they made it for you to do your work, take care of things at home, or get along with other people? Somewhat difficult     Suicide Assessment Five-step Evaluation and Treatment (SAFE-T)    Doing much better, 80% improved.  Not wanting any further change.  States this last few months has been \"an eye opener\" for her on the need for antidepessant.  Son had told her she needed to get back on it, and now he's glad he has his mom back.  She feels she is handling covid stress well, in part with medication and with avoiding the news.  Moved to independent living in [facility], is very happy.  Less stress with daughter Chiquis's partner Arabella, now that not living with them and not not bringing up Arabella with her daughter.  Still a bit of trouble falling asleep but then sleeps fine.  Maybe 30 min to fall asleep on a bad night.  Also going to " bed earlier.  Feels well rested in daytime.  Busy in daytime.    No side effects.    BP - hasn't been out to anywhere to check BP.    BP Readings from Last 3 Encounters:   02/24/20 (!) 154/80   01/11/18 138/82   03/06/17 130/78    Wt Readings from Last 3 Encounters:   02/24/20 56.7 kg (125 lb)   01/11/18 59.9 kg (132 lb)   03/06/17 59.3 kg (130 lb 12.8 oz)        Reviewed and updated as needed this visit by Provider  Tobacco  Allergies  Meds  Problems  Med Hx  Surg Hx  Fam Hx         Review of Systems   ROS COMP: Constitutional, cardiovascular, pulmonary, psych systems are negative, except as otherwise noted.       Objective   Reported vitals:  There were no vitals taken for this visit.   Virtual visit due to covid pandemic  PSYCH: Alert and grossly oriented; coherent speech, normal   rate and volume, able to articulate logical thoughts, able   to abstract reason, no tangential thoughts, no hallucinations   or delusions.  Affect is normal  RESP: No cough, no audible wheezing, able to talk in full sentences  Remainder of exam unable to be completed due to virtual visit            Assessment/Plan:  1. Mild recurrent major depression (H)  - escitalopram (LEXAPRO) 20 MG tablet; Take 1 tablet (20 mg) by mouth daily  Dispense: 30 tablet; Refill: 11    2. Insomnia, unspecified type  improved    3. Elevated BP without diagnosis of hypertension  She will monitor      Return in about 1 year (around 4/15/2021).      Phone call duration:  13 minutes    Susana Taylor PA-C    Patient Instructions   Doing really well now, no change in medicines  Sleep doing ok - if worsening discuss further    When you get a chance to safely check BP, do so  Remember to sit calmly for 5 minutes before checking BP  BP goal under 140/90, or talk to me about it

## 2020-04-15 NOTE — PATIENT INSTRUCTIONS
Doing really well now, no change in medicines  Sleep doing ok - if worsening discuss further    When you get a chance to safely check BP, do so  Remember to sit calmly for 5 minutes before checking BP  BP goal under 140/90, or talk to me about it

## 2020-04-16 ASSESSMENT — ANXIETY QUESTIONNAIRES: GAD7 TOTAL SCORE: 3

## 2021-04-14 DIAGNOSIS — F33.0 MILD RECURRENT MAJOR DEPRESSION (H): ICD-10-CM

## 2021-04-15 RX ORDER — ESCITALOPRAM OXALATE 20 MG/1
20 TABLET ORAL DAILY
Qty: 30 TABLET | Refills: 0 | Status: SHIPPED | OUTPATIENT
Start: 2021-04-15 | End: 2021-05-12

## 2021-04-15 NOTE — TELEPHONE ENCOUNTER
LM on identified VM advising due for OV, lab. Please call care team to schedule in person visit.  Peggy refill given.  SHAYNE Lorenz RN

## 2021-05-12 ENCOUNTER — VIRTUAL VISIT (OUTPATIENT)
Dept: FAMILY MEDICINE | Facility: CLINIC | Age: 76
End: 2021-05-12
Payer: MEDICARE

## 2021-05-12 DIAGNOSIS — R03.0 ELEVATED BP WITHOUT DIAGNOSIS OF HYPERTENSION: ICD-10-CM

## 2021-05-12 DIAGNOSIS — F32.5 DEPRESSION, MAJOR, IN REMISSION (H): ICD-10-CM

## 2021-05-12 DIAGNOSIS — F10.21 ALCOHOLISM IN RECOVERY (H): Primary | ICD-10-CM

## 2021-05-12 PROCEDURE — 99443 PR PHYSICIAN TELEPHONE EVALUATION 21-30 MIN: CPT | Mod: 95 | Performed by: PHYSICIAN ASSISTANT

## 2021-05-12 RX ORDER — ESCITALOPRAM OXALATE 20 MG/1
20 TABLET ORAL DAILY
Qty: 90 TABLET | Refills: 3 | Status: SHIPPED | OUTPATIENT
Start: 2021-05-12 | End: 2022-05-10

## 2021-05-12 ASSESSMENT — ANXIETY QUESTIONNAIRES
5. BEING SO RESTLESS THAT IT IS HARD TO SIT STILL: NOT AT ALL
7. FEELING AFRAID AS IF SOMETHING AWFUL MIGHT HAPPEN: SEVERAL DAYS
1. FEELING NERVOUS, ANXIOUS, OR ON EDGE: SEVERAL DAYS
2. NOT BEING ABLE TO STOP OR CONTROL WORRYING: SEVERAL DAYS
6. BECOMING EASILY ANNOYED OR IRRITABLE: NOT AT ALL
IF YOU CHECKED OFF ANY PROBLEMS ON THIS QUESTIONNAIRE, HOW DIFFICULT HAVE THESE PROBLEMS MADE IT FOR YOU TO DO YOUR WORK, TAKE CARE OF THINGS AT HOME, OR GET ALONG WITH OTHER PEOPLE: SOMEWHAT DIFFICULT
GAD7 TOTAL SCORE: 4
3. WORRYING TOO MUCH ABOUT DIFFERENT THINGS: SEVERAL DAYS

## 2021-05-12 ASSESSMENT — PATIENT HEALTH QUESTIONNAIRE - PHQ9
SUM OF ALL RESPONSES TO PHQ QUESTIONS 1-9: 5
5. POOR APPETITE OR OVEREATING: NOT AT ALL

## 2021-05-12 NOTE — PATIENT INSTRUCTIONS
Suggest checking BP occasionally     Do finish your health directive and get a copy to the clinic    Decided wanted to stay on current dose of lexapro and to take the risk of no EKG

## 2021-05-12 NOTE — PROGRESS NOTES
"Isabella is a 76 year old who is being evaluated via a billable telephone visit.      What phone number would you like to be contacted at? [phone]  How would you like to obtain your AVS?     Assessment & Plan     Depression, major, in remission (H)  Doing well - discussed trial dose decrease due to age and QT prolongation risk.  Patient not interested in decrease nor in EKG to mitigate risk, prefers to take the risk.  She also declines cancer screenings and some vaccines, and is in understanding of risks.  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    SHANKAR Vallecillo [facility]    Subjective   Isabella is a 76 year old who presents for the following health issues     HPI     Depression and Anxiety Follow-Up    How are you doing with your depression since your last visit? Improved     How are you doing with your anxiety since your last visit?  Improved     Are you having other symptoms that might be associated with depression or anxiety? No    Have you had a significant life event? No     Do you have any concerns with your use of alcohol or other drugs? No      Covid in Dec - in bed x 2 wks.  Taste and smell still recovering.  Still very happy to be living in senior living apartment in [facility].  Still sees daughter a lot.  Still stress with daughter's partner.  Lost their dogs this yr which was hard.  Walks every day.  Avoids elevator.  Has another great grand daughter now.  2 total.    States has had covid twice.  Not interested in covid vaccine - \"not a lot of trust\".    Occasionally checks " BP.    Social History     Tobacco Use     Smoking status: Never Smoker     Smokeless tobacco: Never Used   Substance Use Topics     Alcohol use: No     Comment: recovering alcoholic since 1985     Drug use: No     PHQ 1/11/2018 2/24/2020 4/15/2020   PHQ-9 Total Score 9 13 3   Q9: Thoughts of better off dead/self-harm past 2 weeks Several days Not at all Not at all     MARYCRUZ-7 SCORE 1/11/2018 2/24/2020 4/15/2020   Total Score - - -   Total Score 7 (mild anxiety) - -   Total Score 7 11 3     Last PHQ-9 5/12/2021   1.  Little interest or pleasure in doing things 2   2.  Feeling down, depressed, or hopeless 1   3.  Trouble falling or staying asleep, or sleeping too much 1   4.  Feeling tired or having little energy 0   5.  Poor appetite or overeating 0   6.  Feeling bad about yourself 0   7.  Trouble concentrating 1   8.  Moving slowly or restless 0   Q9: Thoughts of better off dead/self-harm past 2 weeks 0   PHQ-9 Total Score 5   Difficulty at work, home, or with people Somewhat difficult     MARYCRUZ-7  5/12/2021   1. Feeling nervous, anxious, or on edge 1   2. Not being able to stop or control worrying 1   3. Worrying too much about different things 1   4. Trouble relaxing 0   5. Being so restless that it is hard to sit still 0   6. Becoming easily annoyed or irritable 0   7. Feeling afraid, as if something awful might happen 1   MARYCRUZ-7 Total Score 4   If you checked any problems, how difficult have they made it for you to do your work, take care of things at home, or get along with other people? Somewhat difficult           Objective           Vitals:  No vitals were obtained today due to virtual visit.    Physical Exam   healthy, alert and no distress  PSYCH: Alert and oriented times 3; coherent speech, normal   rate and volume, able to articulate logical thoughts, able   to abstract reason, no tangential thoughts, no hallucinations   or delusions  Her affect is normal  RESP: No cough, no audible wheezing, able to talk in  full sentences  Remainder of exam unable to be completed due to telephone visits        Phone call duration: 21 minutes

## 2021-05-13 ASSESSMENT — ANXIETY QUESTIONNAIRES: GAD7 TOTAL SCORE: 4

## 2021-07-16 ENCOUNTER — RECORDS - HEALTHEAST (OUTPATIENT)
Dept: ADMINISTRATIVE | Facility: CLINIC | Age: 76
End: 2021-07-16

## 2022-05-10 DIAGNOSIS — F32.5 DEPRESSION, MAJOR, IN REMISSION (H): ICD-10-CM

## 2022-05-10 RX ORDER — ESCITALOPRAM OXALATE 20 MG/1
TABLET ORAL
Qty: 30 TABLET | Refills: 0 | Status: SHIPPED | OUTPATIENT
Start: 2022-05-10 | End: 2022-05-31

## 2022-05-31 DIAGNOSIS — F32.5 DEPRESSION, MAJOR, IN REMISSION (H): ICD-10-CM

## 2022-06-01 RX ORDER — ESCITALOPRAM OXALATE 20 MG/1
20 TABLET ORAL DAILY
Qty: 30 TABLET | Refills: 0 | Status: SHIPPED | OUTPATIENT
Start: 2022-06-01 | End: 2022-06-29

## 2022-06-29 ENCOUNTER — OFFICE VISIT (OUTPATIENT)
Dept: FAMILY MEDICINE | Facility: CLINIC | Age: 77
End: 2022-06-29
Payer: COMMERCIAL

## 2022-06-29 VITALS
HEART RATE: 72 BPM | SYSTOLIC BLOOD PRESSURE: 160 MMHG | HEIGHT: 62 IN | DIASTOLIC BLOOD PRESSURE: 72 MMHG | OXYGEN SATURATION: 98 % | BODY MASS INDEX: 24.66 KG/M2 | TEMPERATURE: 97.7 F | WEIGHT: 134 LBS | RESPIRATION RATE: 24 BRPM

## 2022-06-29 DIAGNOSIS — Z11.59 NEED FOR HEPATITIS C SCREENING TEST: ICD-10-CM

## 2022-06-29 DIAGNOSIS — R03.0 ELEVATED BP WITHOUT DIAGNOSIS OF HYPERTENSION: ICD-10-CM

## 2022-06-29 DIAGNOSIS — F32.5 DEPRESSION, MAJOR, IN REMISSION (H): Primary | ICD-10-CM

## 2022-06-29 DIAGNOSIS — E78.5 DYSLIPIDEMIA: ICD-10-CM

## 2022-06-29 DIAGNOSIS — Z28.20 VACCINE REFUSED BY PATIENT: ICD-10-CM

## 2022-06-29 DIAGNOSIS — Z12.11 SCREEN FOR COLON CANCER: ICD-10-CM

## 2022-06-29 DIAGNOSIS — F10.21 ALCOHOLISM IN RECOVERY (H): ICD-10-CM

## 2022-06-29 DIAGNOSIS — Z13.1 SCREENING FOR DIABETES MELLITUS: ICD-10-CM

## 2022-06-29 DIAGNOSIS — Z12.31 VISIT FOR SCREENING MAMMOGRAM: ICD-10-CM

## 2022-06-29 DIAGNOSIS — Z13.220 SCREENING FOR HYPERLIPIDEMIA: ICD-10-CM

## 2022-06-29 DIAGNOSIS — H91.90 HEARING LOSS, UNSPECIFIED HEARING LOSS TYPE, UNSPECIFIED LATERALITY: ICD-10-CM

## 2022-06-29 DIAGNOSIS — R73.01 ELEVATED FASTING GLUCOSE: ICD-10-CM

## 2022-06-29 LAB
CHOLEST SERPL-MCNC: 231 MG/DL
FASTING STATUS PATIENT QL REPORTED: YES
FASTING STATUS PATIENT QL REPORTED: YES
GLUCOSE BLD-MCNC: 122 MG/DL (ref 70–99)
HDLC SERPL-MCNC: 47 MG/DL
LDLC SERPL CALC-MCNC: 136 MG/DL
NONHDLC SERPL-MCNC: 184 MG/DL
TRIGL SERPL-MCNC: 239 MG/DL

## 2022-06-29 PROCEDURE — 82947 ASSAY GLUCOSE BLOOD QUANT: CPT | Performed by: PHYSICIAN ASSISTANT

## 2022-06-29 PROCEDURE — 80061 LIPID PANEL: CPT | Performed by: PHYSICIAN ASSISTANT

## 2022-06-29 PROCEDURE — 99214 OFFICE O/P EST MOD 30 MIN: CPT | Performed by: PHYSICIAN ASSISTANT

## 2022-06-29 PROCEDURE — 36415 COLL VENOUS BLD VENIPUNCTURE: CPT | Performed by: PHYSICIAN ASSISTANT

## 2022-06-29 RX ORDER — ESCITALOPRAM OXALATE 20 MG/1
20 TABLET ORAL DAILY
Qty: 90 TABLET | Refills: 3 | Status: SHIPPED | OUTPATIENT
Start: 2022-06-29 | End: 2023-07-05

## 2022-06-29 RX ORDER — AMLODIPINE BESYLATE 5 MG/1
5 TABLET ORAL DAILY
Qty: 30 TABLET | Refills: 0 | Status: CANCELLED | OUTPATIENT
Start: 2022-06-29

## 2022-06-29 RX ORDER — MULTIVIT-MIN/IRON/FOLIC ACID/K 18-600-40
CAPSULE ORAL
COMMUNITY

## 2022-06-29 ASSESSMENT — ANXIETY QUESTIONNAIRES
5. BEING SO RESTLESS THAT IT IS HARD TO SIT STILL: NOT AT ALL
GAD7 TOTAL SCORE: 1
GAD7 TOTAL SCORE: 1
6. BECOMING EASILY ANNOYED OR IRRITABLE: NOT AT ALL
7. FEELING AFRAID AS IF SOMETHING AWFUL MIGHT HAPPEN: NOT AT ALL
IF YOU CHECKED OFF ANY PROBLEMS ON THIS QUESTIONNAIRE, HOW DIFFICULT HAVE THESE PROBLEMS MADE IT FOR YOU TO DO YOUR WORK, TAKE CARE OF THINGS AT HOME, OR GET ALONG WITH OTHER PEOPLE: NOT DIFFICULT AT ALL
1. FEELING NERVOUS, ANXIOUS, OR ON EDGE: NOT AT ALL
2. NOT BEING ABLE TO STOP OR CONTROL WORRYING: NOT AT ALL
3. WORRYING TOO MUCH ABOUT DIFFERENT THINGS: NOT AT ALL

## 2022-06-29 ASSESSMENT — PATIENT HEALTH QUESTIONNAIRE - PHQ9
SUM OF ALL RESPONSES TO PHQ QUESTIONS 1-9: 4
5. POOR APPETITE OR OVEREATING: SEVERAL DAYS

## 2022-06-29 NOTE — RESULT ENCOUNTER NOTE
Please place any/all future orders discussed below.    Please notify patient -  Cholesterol is mildly high but dramatically better than in the past.  I am very happy with the improvement.  Continue the physical activity.  For healthy eating, consider the Mediterranean diet.  This means eating a lot of fruits and vegetables and consuming healthy oils (olive oil, nuts, seeds, avocado, fatty unfried fish such as salmon).  More information of this diet can be found at [email or web address]/[email or web address].     Otherwise, blood sugar screening test for diabetes came back high and needing further testing.  You have either diabetes or pre-diabetes.  Please set up a lab-only appt to do the further test.  Susana

## 2022-06-29 NOTE — PATIENT INSTRUCTIONS
Labs     Refilled mood medicine    Check BPs and write them down.  Goal under 140/90.  Nurse will call to get update - will start lisinopril if needed.      Declined cancer screenings, vaccines, etc

## 2022-06-29 NOTE — PROGRESS NOTES
"  Assessment & Plan     (F32.5) Depression, major, in remission (H)  (primary encounter diagnosis)  Comment: stable, doesn't want trial dose decrease even if may be safer to try decrease  Plan: escitalopram (LEXAPRO) 20 MG tablet    (F10.21) Alcoholism in recovery (H)  Comment:stable     (E78.5) Dyslipidemia  (Z13.220) Screening for hyperlipidemia  Comment: typically declines labs - last lab 2012 had   Plan: Lipid panel reflex to direct LDL Non-fasting    (R03.0) Elevated BP without diagnosis of hypertension  Comment: she will monitor - start lisinopril if remains elevated.  She is agreeable to the lab monitoring of this.  Prefers to avoid anything to cause puffy ankles.    (H91.90) Hearing loss, unspecified hearing loss type, unspecified laterality  Comment: new problem  Plan: Adult Audiology  Referral    (Z13.1) Screening for diabetes mellitus  Plan: Glucose     Patient Instructions   Labs     Refilled mood medicine    Check BPs.  Goal under 140/90.      No follow-ups on file.    Susana Taylor PA-C  [facility]    Amisha Mason is a 77 year old, presenting for the following health issues:  Hypertension      HPI     Hypertension Follow-up    Do you check your blood pressure regularly outside of the clinic? No     Are you following a low salt diet? No    Depression and Anxiety Follow-Up    How are you doing with your depression since your last visit? Improved     How are you doing with your anxiety since your last visit?  Improved     Are you having other symptoms that might be associated with depression or anxiety? No    Have you had a significant life event? No     Do you have any concerns with your use of alcohol or other drugs? No    Still loving her senior living apartment - \"3rd floor penthouse\".  Socializing at the apartments.  Got rid of gentleman friend recently who lied a lot.  Thinks she's done dating.  Short term memory - names, and remembering " "why she went to kitchen.  No safety concerns.  Word puzzles.  Still taking her walks - 3 mi every other day.  Still does steps over elevator - 2-3 x a day.  Stepper exerciser and working on weights with her arms.  Still the 2 great granddaughters - sees them occasionally.  Daughter's partner has been better towards her.  Sleep - taking melatonin 6 mg.  Reading at bedtime.  Her wake up time varies when has trouble falling asleep, but feels she gets enough sleep.  No alcohol.      Hearing still poor. \"White noise\" worse at night.     Elev BP today.  Doesn't monitor.  Sister has a cuff.     Social History     Tobacco Use     Smoking status: Never Smoker     Smokeless tobacco: Never Used   Substance Use Topics     Alcohol use: No     Comment: recovering alcoholic since 1985     Drug use: No     PHQ 4/15/2020 5/12/2021 6/29/2022   PHQ-9 Total Score 3 5 4   Q9: Thoughts of better off dead/self-harm past 2 weeks Not at all Not at all Not at all     MARYCRUZ-7 SCORE 4/15/2020 5/12/2021 6/29/2022   Total Score - - -   Total Score - - -   Total Score 3 4 1     Last PHQ-9 6/29/2022   1.  Little interest or pleasure in doing things 1   2.  Feeling down, depressed, or hopeless 1   3.  Trouble falling or staying asleep, or sleeping too much 1   4.  Feeling tired or having little energy 0   5.  Poor appetite or overeating 0   6.  Feeling bad about yourself 0   7.  Trouble concentrating 1   8.  Moving slowly or restless 0   Q9: Thoughts of better off dead/self-harm past 2 weeks 0   PHQ-9 Total Score 4   Difficulty at work, home, or with people Somewhat difficult     MARYCRUZ-7  6/29/2022   1. Feeling nervous, anxious, or on edge 0   2. Not being able to stop or control worrying 0   3. Worrying too much about different things 0   4. Trouble relaxing 1   5. Being so restless that it is hard to sit still 0   6. Becoming easily annoyed or irritable 0   7. Feeling afraid, as if something awful might happen 0   MARYCRUZ-7 Total Score 1   If you checked " "any problems, how difficult have they made it for you to do your work, take care of things at home, or get along with other people? Not difficult at all         Objective    Resp 24   Ht 1.575 m (5' 2\")   Wt 60.8 kg (134 lb)   BMI 24.51 kg/m    Body mass index is 24.51 kg/m .  Physical Exam   GENERAL: healthy, alert and no distress  HENT: ear canals and TM's normal  CV: regular rate and rhythm, normal S1 S2, no S3 or S4, no murmur, click or rub, no peripheral edema   PSYCH: mentation appears normal, affect normal/bright              "

## 2022-07-12 ENCOUNTER — TELEPHONE (OUTPATIENT)
Dept: FAMILY MEDICINE | Facility: CLINIC | Age: 77
End: 2022-07-12

## 2022-08-19 ENCOUNTER — OFFICE VISIT (OUTPATIENT)
Dept: AUDIOLOGY | Facility: CLINIC | Age: 77
End: 2022-08-19
Attending: PHYSICIAN ASSISTANT
Payer: COMMERCIAL

## 2022-08-19 DIAGNOSIS — H93.13 TINNITUS, BILATERAL: ICD-10-CM

## 2022-08-19 DIAGNOSIS — H91.90 HEARING LOSS, UNSPECIFIED HEARING LOSS TYPE, UNSPECIFIED LATERALITY: ICD-10-CM

## 2022-08-19 DIAGNOSIS — H90.3 SENSORINEURAL HEARING LOSS, BILATERAL: Primary | ICD-10-CM

## 2022-08-19 PROCEDURE — 92557 COMPREHENSIVE HEARING TEST: CPT | Performed by: AUDIOLOGIST

## 2022-08-19 PROCEDURE — 92550 TYMPANOMETRY & REFLEX THRESH: CPT | Performed by: AUDIOLOGIST

## 2022-08-19 NOTE — PROGRESS NOTES
AUDIOLOGY REPORT    SUBJECTIVE:  Isabella Acosta is a 77 year old female who was seen in the Audiology Clinic [facility] on 8/19/22 for audiologic evaluation, referred by Susaan Taylor PA-C. The patient reports a gradual decline in hearing and bilateral tinnitus (white noise) with an occasional left ear 'clicking'. The patient denies  bilateral otalgia, bilateral drainage, bilateral aural fullness, family history of hearing loss, history of noise exposure, and dizziness. The patient notes difficulty with communication in a variety of listening situations. Patient was unaccompanied to today's visit.     Abuse Screening:  Do you feel unsafe at home or work/school? No  Do you feel threatened by someone? No  Does anyone try to keep you from having contact with others, or doing things outside of your home? No  Physical signs of abuse present? No     OBJECTIVE:    Otoscopic exam indicates ears are clear of cerumen bilaterally     Pure Tone Thresholds assessed using standard techniques  audiometry with good  reliability from 250-8000 Hz bilaterally using insert earphones and circumaural headphones     RIGHT:  mild and moderate-severe sensorineural hearing loss    LEFT:    moderate and severe sensorineural hearing loss    NOTE: Change in transducers did not merit a change in thresholds.     Tympanogram:    RIGHT: restricted eardrum mobility (Type As)    LEFT:   restricted eardrum mobility (Type As)    Reflexes (reported by stimulus ear): 1000 Hz  RIGHT: Ipsilateral is absent at frequencies tested  RIGHT: Contralateral is absent at frequencies tested  LEFT:   Ipsilateral is absent at frequencies tested  LEFT:   Contralateral is elevated     Speech Reception Threshold:    RIGHT: 50 dB HL    LEFT:   60 dB HL    Word Recognition Score:     RIGHT: 96% at 90 dB HL using NU-6 recorded word list.    LEFT:   88% at 90 dB HL using NU-6 recorded word list.    ASSESSMENT:   Bilateral sensorineural  hearing loss and tinnitus     Today s results were discussed with the patient in detail.     PLAN:  Patient was counseled regarding hearing loss and impact on communication.  Patient is a good candidate for amplification at this time. Handout on good communication strategies, and hearing aid use was given to patient. It is recommended that the patient return for a hearing aid fitting as patient has hearing aids from a family member who has passed and they can be set to her hearing loss and verified here in clinic.  Please call this clinic with questions regarding these results or recommendations.    Ananth Jefferson CCC-A  Licensed Audiologist #[id number]  8/19/2022    CC: Susana Taylor PA-C

## 2022-10-04 ENCOUNTER — TELEPHONE (OUTPATIENT)
Dept: FAMILY MEDICINE | Facility: CLINIC | Age: 77
End: 2022-10-04

## 2022-10-04 NOTE — TELEPHONE ENCOUNTER
,     I called and spoke with pt  About her updates on BP's. I did call a month ago but pt was waiting on her new BP cuff and was not ready to give updates.  She states she has been following a low salt diet. Her BP range has been 141/79, 135/69, 134/68       Alley Finnegan CMA

## 2023-10-12 ENCOUNTER — OFFICE VISIT (OUTPATIENT)
Dept: FAMILY MEDICINE | Facility: CLINIC | Age: 78
End: 2023-10-12
Payer: COMMERCIAL

## 2023-10-12 VITALS
BODY MASS INDEX: 23.74 KG/M2 | SYSTOLIC BLOOD PRESSURE: 117 MMHG | OXYGEN SATURATION: 99 % | WEIGHT: 129 LBS | TEMPERATURE: 98.1 F | HEART RATE: 62 BPM | HEIGHT: 62 IN | DIASTOLIC BLOOD PRESSURE: 76 MMHG | RESPIRATION RATE: 16 BRPM

## 2023-10-12 DIAGNOSIS — R03.0 ELEVATED BP WITHOUT DIAGNOSIS OF HYPERTENSION: ICD-10-CM

## 2023-10-12 DIAGNOSIS — F32.5 DEPRESSION, MAJOR, IN REMISSION (H): ICD-10-CM

## 2023-10-12 DIAGNOSIS — R73.01 ELEVATED FASTING GLUCOSE: ICD-10-CM

## 2023-10-12 DIAGNOSIS — F10.21 ALCOHOLISM IN RECOVERY (H): Primary | ICD-10-CM

## 2023-10-12 PROBLEM — H91.90 HARD OF HEARING: Status: ACTIVE | Noted: 2023-10-12

## 2023-10-12 PROBLEM — Z78.9 HEALTH MAINTENANCE ALTERATION: Status: ACTIVE | Noted: 2022-06-29

## 2023-10-12 PROCEDURE — 99214 OFFICE O/P EST MOD 30 MIN: CPT | Performed by: PHYSICIAN ASSISTANT

## 2023-10-12 RX ORDER — MULTIVIT WITH MINERALS/LUTEIN
1 TABLET ORAL DAILY
COMMUNITY

## 2023-10-12 RX ORDER — RESPIRATORY SYNCYTIAL VIRUS VACCINE 120MCG/0.5
0.5 KIT INTRAMUSCULAR ONCE
Qty: 1 EACH | Refills: 0 | Status: CANCELLED | OUTPATIENT
Start: 2023-10-12 | End: 2023-10-12

## 2023-10-12 RX ORDER — ESCITALOPRAM OXALATE 20 MG/1
20 TABLET ORAL DAILY
Qty: 90 TABLET | Refills: 3 | Status: SHIPPED | OUTPATIENT
Start: 2023-10-12

## 2023-10-12 ASSESSMENT — PATIENT HEALTH QUESTIONNAIRE - PHQ9
SUM OF ALL RESPONSES TO PHQ QUESTIONS 1-9: 5
10. IF YOU CHECKED OFF ANY PROBLEMS, HOW DIFFICULT HAVE THESE PROBLEMS MADE IT FOR YOU TO DO YOUR WORK, TAKE CARE OF THINGS AT HOME, OR GET ALONG WITH OTHER PEOPLE: NOT DIFFICULT AT ALL
SUM OF ALL RESPONSES TO PHQ QUESTIONS 1-9: 5

## 2023-10-12 ASSESSMENT — ANXIETY QUESTIONNAIRES
7. FEELING AFRAID AS IF SOMETHING AWFUL MIGHT HAPPEN: NOT AT ALL
6. BECOMING EASILY ANNOYED OR IRRITABLE: NOT AT ALL
4. TROUBLE RELAXING: NOT AT ALL
5. BEING SO RESTLESS THAT IT IS HARD TO SIT STILL: NOT AT ALL
3. WORRYING TOO MUCH ABOUT DIFFERENT THINGS: NOT AT ALL
2. NOT BEING ABLE TO STOP OR CONTROL WORRYING: NOT AT ALL
GAD7 TOTAL SCORE: 1
GAD7 TOTAL SCORE: 1
IF YOU CHECKED OFF ANY PROBLEMS ON THIS QUESTIONNAIRE, HOW DIFFICULT HAVE THESE PROBLEMS MADE IT FOR YOU TO DO YOUR WORK, TAKE CARE OF THINGS AT HOME, OR GET ALONG WITH OTHER PEOPLE: SOMEWHAT DIFFICULT
1. FEELING NERVOUS, ANXIOUS, OR ON EDGE: SEVERAL DAYS

## 2023-10-12 ASSESSMENT — PAIN SCALES - GENERAL: PAINLEVEL: NO PAIN (0)

## 2023-10-12 NOTE — NURSING NOTE
"Chief Complaint   Patient presents with    Depression    Anxiety       Initial There were no vitals taken for this visit. Estimated body mass index is 24.51 kg/m  as calculated from the following:    Height as of 6/29/22: 1.575 m (5' 2\").    Weight as of 6/29/22: 60.8 kg (134 lb).    Patient presents to the clinic using No DME    Is there anyone who you would like to be able to receive your results? No  If yes have patient fill out JASMYN      "

## 2023-10-12 NOTE — COMMUNITY RESOURCES LIST (ENGLISH)
10/12/2023   [facility] Everywhere  N/A  For questions about this resource list or additional care needs, please contact your primary care clinic or care manager.  Phone: [phone]   Email: N/A   Address: [address] [place] [id number]   Hours: N/A        Food and Nutrition       Food pantry  1  Family [facility] Distance: 1.57 miles      Delivery, Pickup   [address] [place] [address]  Language: English  Hours: Mon 9:00 AM - 5:00 PM , Tue 11:00 AM - 5:00 PM , Thu 9:00 AM - 5:00 PM  Fees: Free   Phone: (163) 489-2236 Email: mail@[employer] Website: https://www.[employer]/our-work/food-access-and-equity/     2  [facility] - Dry Food Pantry Distance: 7.67 miles      Pickup   [address] [place] [address]  Language: English  Hours: Mon - Fri 9:00 AM - 3:00 PM  Fees: Free   Phone: (318) 157-6044 Email: office@[facility] Website: http://www.[facility].net/     SNAP application assistance  3  [facility] [place] Family Investment Program (MFIP) - [place] Family Investment Program (MFIP) Distance: 4.3 miles      In-Person, Phone/Virtual   [address] [address]  Language: English  Hours: Mon - Fri 8:00 AM - 4:30 PM  Fees: Free   Phone: (322) 159-4845 Email: imgeneralquestions@[facility].gov Website: https://www.[facility]./499/MFIP-Biennial-Service-Agreement-VCA-Plan     4  [facility] Community Action [place] ([facility] Office Distance: 12.6 miles      In-Person, Phone/Virtual   [address] [place] [address]  Language: English  Hours: Mon - Fri 8:00 AM - 4:30 PM  Fees: Free   Phone: (130) 843-1592 Email: susan@[facility].org Website: https://www.[facility].org/agency-information     Soup kitchen or free meals  5  [facility] - [facility]  Meal Distance: 20.8 miles      In-Person   [address] [place] [address]  Language: English, Wolof  Hours: Thu 6:00 PM - 7:00 PM  Fees: Free   Phone: (543) 751-7131 Email: [place]@[employer].org Website: https://www.[employer].org     6  [facility] - Family Table Meals - Family Table Meal Distance: 24.2 miles      Pickup   [address] [place] [address]  Language: English  Hours: Thu 5:00 PM - 6:30 PM  Fees: Free   Phone: (834) 304-8345 Email: becky@[employer] Website: http://www.[employer]          Important Numbers & Websites       Emergency Services   911  [facility] Services   311  Poison Control   (389) 700-3374  Suicide Prevention Lifeline   (685) 949-9293 (TALK)  Child Abuse Hotline   (450) 157-8667 (4-A-Child)  Sexual Assault Hotline   (795) 666-7807 (HOPE)  National Runaway Safeline   (478) 728-9685 (RUNAWAY)  All-Options Talkline   (815) 370-5825  Substance Abuse Referral   (949) 719-8564 (HELP)

## 2023-10-12 NOTE — PATIENT INSTRUCTIONS
BP goal under at least 150/90  If running high let me know     Suspect either borderline or have diabetes based on last yr lab - didn't want to recheck    Refilled mood med

## 2023-10-12 NOTE — PROGRESS NOTES
Assessment & Plan     Depression, major, in remission (H24)  stable  - escitalopram (LEXAPRO) 20 MG tablet  Dispense: 90 tablet; Refill: 3    Alcoholism in recovery (H)  Stable, sober since 1985    Elevated BP without diagnosis of hypertension  Appears to be whitecoat though she will continue to monitor    Elevated fasting glucose  She doesn't want it rechecked - fasting glucose 122 last yr     Still no interest in any preventative cares.  Patient Instructions   BP goal under at least 150/90  If running high let me know     Suspect either borderline or have diabetes based on last yr lab - didn't want to recheck    Refilled mood med    Susana Taylor PA-C  [facility]    Amisha Mason is a 78 year old, presenting for the following health issues:  Depression and Anxiety      10/12/2023     2:32 PM   Additional Questions   Roomed by elle yoo cma       History of Present Illness       Mental Health Follow-up:  Patient presents to follow-up on Depression & Anxiety.Patient's depression since last visit has been:  Good  The patient is having other symptoms associated with depression.  Patient's anxiety since last visit has been:  Good  The patient is having other symptoms associated with anxiety.  Any significant life events: relationship concerns and grief or loss  Patient is not feeling anxious or having panic attacks.  Patient has no concerns about alcohol or drug use.    She eats 0-1 servings of fruits and vegetables daily.She consumes 0 sweetened beverage(s) daily.She exercises with enough effort to increase her heart rate 60 or more minutes per day.  She exercises with enough effort to increase her heart rate 5 days per week.   She is taking medications regularly.     Depression and Anxiety Follow-Up  How are you doing with your depression since your last visit? No change  How are you doing with your anxiety since your last visit?  No change  Are you having other symptoms  "that might be associated with depression or anxiety? No  Have you had a significant life event? No   Do you have any concerns with your use of alcohol or other drugs? No    Still loving her senior living apartment - \"3rd floor penthouse\"   Loves to read.  Still doing word puzzles.  Adopted a 16 yr old cat - really enjoys cat.  Sets up, plays and puts away the twice a week Bingo sessions and the building events.   Walking every other day 3 miles still, plus does the 3 flights of stairs in her building.  Sleep about 8 hrs a night, nightowl.  Some use of melatonin.  Memory seems to be helped by word puzzles.  Gets anxiety with holiday social expectations, not hearing in a crowd.  Needs hearing aids adjusted.  Relationship with daughter's significant other Arabella are better.    No alcohol.      BP - checks, runs 140-141/can't remember, at store 117/76.    Prilosec prn, multiple days a week depending what she eats.      Social History     Tobacco Use    Smoking status: Never    Smokeless tobacco: Never   Vaping Use    Vaping Use: Never used   Substance Use Topics    Alcohol use: No     Comment: recovering alcoholic since 1985    Drug use: No         5/12/2021    11:34 AM 6/29/2022     9:26 AM 10/12/2023     1:26 PM   PHQ   PHQ-9 Total Score 5 4 5   Q9: Thoughts of better off dead/self-harm past 2 weeks Not at all Not at all Not at all         5/12/2021    11:34 AM 6/29/2022     9:26 AM 10/12/2023     1:28 PM   MARYCRUZ-7 SCORE   Total Score   1 (minimal anxiety)   Total Score 4 1 1         10/12/2023     1:26 PM   Last PHQ-9   1.  Little interest or pleasure in doing things 1   2.  Feeling down, depressed, or hopeless 1   3.  Trouble falling or staying asleep, or sleeping too much 1   4.  Feeling tired or having little energy 1   5.  Poor appetite or overeating 1   6.  Feeling bad about yourself 0   7.  Trouble concentrating 0   8.  Moving slowly or restless 0   Q9: Thoughts of better off dead/self-harm past 2 weeks 0   PHQ-9 " Total Score 5         10/12/2023     1:28 PM   MARYCRUZ-7    1. Feeling nervous, anxious, or on edge 1   2. Not being able to stop or control worrying 0   3. Worrying too much about different things 0   4. Trouble relaxing 0   5. Being so restless that it is hard to sit still 0   6. Becoming easily annoyed or irritable 0   7. Feeling afraid, as if something awful might happen 0   MARYCRUZ-7 Total Score 1   If you checked any problems, how difficult have they made it for you to do your work, take care of things at home, or get along with other people? Somewhat difficult     Dizziness  Onset/Duration: 08/2023  Description:   Do you feel faint: No  Does it feel like the surroundings (bed, room) are moving: YES  Unsteady/off balance: YES  Have you passed out or fallen: No  Intensity: improving  Progression of Symptoms: improving  Accompanying Signs & Symptoms:  Heart palpitations or chest pain: No  Nausea, vomiting: No  Weakness or lack of coordination in arms or legs: No  Vision or speech changes: No  Numbness or tingling: No  Ringing in ears (Tinnitus): No  Hearing Loss: YES  History:   Head trauma/concussion history: No  Previous similar symptoms: No  Recent bleeding history: No  Any new medications (BP?): No    Objective    There were no vitals taken for this visit.  There is no height or weight on file to calculate BMI.

## 2024-04-06 ENCOUNTER — HOSPITAL ENCOUNTER (EMERGENCY)
Facility: CLINIC | Age: 79
Discharge: HOME | End: 2024-04-06
Attending: EMERGENCY MEDICINE | Admitting: EMERGENCY MEDICINE
Payer: COMMERCIAL

## 2024-04-06 VITALS
OXYGEN SATURATION: 94 % | RESPIRATION RATE: 24 BRPM | DIASTOLIC BLOOD PRESSURE: 92 MMHG | HEART RATE: 82 BPM | SYSTOLIC BLOOD PRESSURE: 162 MMHG | HEIGHT: 62 IN | BODY MASS INDEX: 24.84 KG/M2 | WEIGHT: 135 LBS

## 2024-04-06 DIAGNOSIS — R11.2 NAUSEA VOMITING AND DIARRHEA: ICD-10-CM

## 2024-04-06 DIAGNOSIS — R19.7 NAUSEA VOMITING AND DIARRHEA: ICD-10-CM

## 2024-04-06 LAB
ALBUMIN SERPL BCG-MCNC: 4.5 G/DL (ref 3.5–5.2)
ALP SERPL-CCNC: 65 U/L (ref 40–150)
ALT SERPL W P-5'-P-CCNC: 25 U/L (ref 0–50)
ANION GAP SERPL CALCULATED.3IONS-SCNC: 11 MMOL/L (ref 7–15)
AST SERPL W P-5'-P-CCNC: 30 U/L (ref 0–45)
BASOPHILS # BLD AUTO: 0 10E3/UL (ref 0–0.2)
BASOPHILS NFR BLD AUTO: 0 %
BILIRUB DIRECT SERPL-MCNC: <0.2 MG/DL (ref 0–0.3)
BILIRUB SERPL-MCNC: 0.7 MG/DL
BUN SERPL-MCNC: 17.2 MG/DL (ref 8–23)
CALCIUM SERPL-MCNC: 9 MG/DL (ref 8.8–10.2)
CHLORIDE SERPL-SCNC: 101 MMOL/L (ref 98–107)
CREAT SERPL-MCNC: 0.63 MG/DL (ref 0.51–0.95)
DEPRECATED HCO3 PLAS-SCNC: 25 MMOL/L (ref 22–29)
EGFRCR SERPLBLD CKD-EPI 2021: >90 ML/MIN/1.73M2
EOSINOPHIL # BLD AUTO: 0 10E3/UL (ref 0–0.7)
EOSINOPHIL NFR BLD AUTO: 0 %
ERYTHROCYTE [DISTWIDTH] IN BLOOD BY AUTOMATED COUNT: 11.5 % (ref 10–15)
GLUCOSE SERPL-MCNC: 157 MG/DL (ref 70–99)
HCT VFR BLD AUTO: 37 % (ref 35–47)
HGB BLD-MCNC: 12.8 G/DL (ref 11.7–15.7)
HOLD SPECIMEN: NORMAL
IMM GRANULOCYTES # BLD: 0 10E3/UL
IMM GRANULOCYTES NFR BLD: 0 %
LIPASE SERPL-CCNC: 23 U/L (ref 13–60)
LYMPHOCYTES # BLD AUTO: 0.6 10E3/UL (ref 0.8–5.3)
LYMPHOCYTES NFR BLD AUTO: 6 %
MAGNESIUM SERPL-MCNC: 1.7 MG/DL (ref 1.7–2.3)
MCH RBC QN AUTO: 31.5 PG (ref 26.5–33)
MCHC RBC AUTO-ENTMCNC: 34.6 G/DL (ref 31.5–36.5)
MCV RBC AUTO: 91 FL (ref 78–100)
MONOCYTES # BLD AUTO: 0.3 10E3/UL (ref 0–1.3)
MONOCYTES NFR BLD AUTO: 3 %
NEUTROPHILS # BLD AUTO: 9.6 10E3/UL (ref 1.6–8.3)
NEUTROPHILS NFR BLD AUTO: 92 %
NRBC # BLD AUTO: 0 10E3/UL
NRBC BLD AUTO-RTO: 0 /100
PLATELET # BLD AUTO: 226 10E3/UL (ref 150–450)
POTASSIUM SERPL-SCNC: 4 MMOL/L (ref 3.4–5.3)
PROT SERPL-MCNC: 7 G/DL (ref 6.4–8.3)
RBC # BLD AUTO: 4.06 10E6/UL (ref 3.8–5.2)
SODIUM SERPL-SCNC: 137 MMOL/L (ref 135–145)
WBC # BLD AUTO: 10.5 10E3/UL (ref 4–11)

## 2024-04-06 PROCEDURE — 96361 HYDRATE IV INFUSION ADD-ON: CPT | Performed by: EMERGENCY MEDICINE

## 2024-04-06 PROCEDURE — 99284 EMERGENCY DEPT VISIT MOD MDM: CPT | Mod: 25 | Performed by: EMERGENCY MEDICINE

## 2024-04-06 PROCEDURE — 93005 ELECTROCARDIOGRAM TRACING: CPT | Performed by: EMERGENCY MEDICINE

## 2024-04-06 PROCEDURE — 82248 BILIRUBIN DIRECT: CPT | Performed by: EMERGENCY MEDICINE

## 2024-04-06 PROCEDURE — 85025 COMPLETE CBC W/AUTO DIFF WBC: CPT | Performed by: EMERGENCY MEDICINE

## 2024-04-06 PROCEDURE — 80053 COMPREHEN METABOLIC PANEL: CPT | Performed by: EMERGENCY MEDICINE

## 2024-04-06 PROCEDURE — 36415 COLL VENOUS BLD VENIPUNCTURE: CPT | Performed by: EMERGENCY MEDICINE

## 2024-04-06 PROCEDURE — 258N000003 HC RX IP 258 OP 636: Performed by: EMERGENCY MEDICINE

## 2024-04-06 PROCEDURE — 96374 THER/PROPH/DIAG INJ IV PUSH: CPT | Performed by: EMERGENCY MEDICINE

## 2024-04-06 PROCEDURE — 83690 ASSAY OF LIPASE: CPT | Performed by: EMERGENCY MEDICINE

## 2024-04-06 PROCEDURE — 250N000011 HC RX IP 250 OP 636: Performed by: EMERGENCY MEDICINE

## 2024-04-06 PROCEDURE — 93010 ELECTROCARDIOGRAM REPORT: CPT | Performed by: EMERGENCY MEDICINE

## 2024-04-06 PROCEDURE — 83735 ASSAY OF MAGNESIUM: CPT | Performed by: EMERGENCY MEDICINE

## 2024-04-06 RX ORDER — ONDANSETRON 2 MG/ML
4 INJECTION INTRAMUSCULAR; INTRAVENOUS EVERY 30 MIN PRN
Status: DISCONTINUED | OUTPATIENT
Start: 2024-04-06 | End: 2024-04-06 | Stop reason: HOSPADM

## 2024-04-06 RX ORDER — ONDANSETRON 4 MG/1
4 TABLET, ORALLY DISINTEGRATING ORAL EVERY 8 HOURS PRN
Qty: 10 TABLET | Refills: 0 | Status: SHIPPED | OUTPATIENT
Start: 2024-04-06

## 2024-04-06 RX ADMIN — ONDANSETRON 4 MG: 2 INJECTION INTRAMUSCULAR; INTRAVENOUS at 08:47

## 2024-04-06 RX ADMIN — SODIUM CHLORIDE, POTASSIUM CHLORIDE, SODIUM LACTATE AND CALCIUM CHLORIDE 1000 ML: 600; 310; 30; 20 INJECTION, SOLUTION INTRAVENOUS at 08:48

## 2024-04-06 ASSESSMENT — COLUMBIA-SUICIDE SEVERITY RATING SCALE - C-SSRS
1. IN THE PAST MONTH, HAVE YOU WISHED YOU WERE DEAD OR WISHED YOU COULD GO TO SLEEP AND NOT WAKE UP?: NO
6. HAVE YOU EVER DONE ANYTHING, STARTED TO DO ANYTHING, OR PREPARED TO DO ANYTHING TO END YOUR LIFE?: NO
2. HAVE YOU ACTUALLY HAD ANY THOUGHTS OF KILLING YOURSELF IN THE PAST MONTH?: NO

## 2024-04-06 ASSESSMENT — ACTIVITIES OF DAILY LIVING (ADL): ADLS_ACUITY_SCORE: 35

## 2024-04-06 NOTE — DISCHARGE INSTRUCTIONS
Follow-up in clinic for routine cares.  Discuss your ongoing vertigo with your primary care provider.    Zofran as needed for nausea, or vomiting

## 2024-04-06 NOTE — ED PROVIDER NOTES
ED Provider Note  [facility]      History     Chief Complaint   Patient presents with    Palpitations    Nausea, Vomiting, & Diarrhea     HPI  Isabella Acosta is a 79 year old female who presents via EMS for concerns regarding nausea, vomiting, and diarrhea.  Patient states she was otherwise feeling well up until yesterday evening.  Patient then began feeling sick to her stomach, and ended up throwing up yesterday evening.  She states that she does not get sick often.  She does not throw up very often.  She awoke at around 2:30 AM, feeling sick to her stomach, and had another episode of vomiting.  She did have 3 episodes of loose stool in the last 24 hours.  No blood in the vomit, or blood in the stool.  No other ill contacts.  No fever.  No recent travel.  She called her family who recommended evaluation.  Patient denies any current pains.        Independent Historian:        Review of External Notes:          Allergies:  Allergies   Allergen Reactions    Asa [Aspirin]      Avoids because of bleeding ulcer.    Morphine And Related Nausea and Vomiting       Problem List:    Patient Active Problem List    Diagnosis Date Noted    Hard of hearing 10/12/2023     Priority: Medium     Has hearing aids but needs them adjusted.      Alcoholism in recovery (H) 2022     Priority: Medium    Health maintenance alteration 2022     Priority: Medium     She declines preventative care, cancer screens and vaccines.      Elevated fasting glucose 2022     Priority: Medium     122 in 2022.      DJD (degenerative joint disease) 10/17/2011     Priority: Medium    HYPERLIPIDEMIA LDL GOAL <130 10/31/2010     Priority: Medium    Sleeping difficulty 2009     Priority: Medium     Night-owl who tries to use melatonin more than true insomnia.        Family history of ischemic heart disease 2009     Priority: Medium     Father  at 52.  He was a smoker.       Depression, major, in  remission (H24)      Priority: Medium     Had been on fluoxetine for 10 years and started breaking thru with depression symptoms.  Was started on lexapro and has done well.  Wants to stay on long term.   Thinks she had some depression as a child, then self medicated with alcohol.  Went thru treatment in 87.  Dry since.  Had done well on lexapro for 3 years.  Tried to switch to celexa and had return of all of her anxiety symptoms at 40mg and couldn't tolerate the 60mg with feelings of dissociation.               Past Medical History:    Past Medical History:   Diagnosis Date    2019 novel coronavirus disease (COVID-19) 12/2020       Past Surgical History:    Past Surgical History:   Procedure Laterality Date    APPENDECTOMY      CHOLECYSTECTOMY, LAPOROSCOPIC      Cholecystectomy, Laparoscopic    HYSTERECTOMY, PAP NO LONGER INDICATED      HYSTERECTOMY, KYLER      SALPINGO OOPHORECTOMY,R/L/GAIL      Salpingo Oophorectomy, RT/LT/GAIL       Family History:    Family History   Problem Relation Age of Onset    Cerebrovascular Disease Mother     Allergies Mother     Eye Disorder Mother     Heart Disease Father     Cardiovascular Maternal Grandfather     Heart Disease Maternal Grandfather     Respiratory Brother 77        mesothelioma    Respiratory Sister         COPD       Social History:  Marital Status:  Single [1]  Social History     Tobacco Use    Smoking status: Never    Smokeless tobacco: Never   Vaping Use    Vaping Use: Never used   Substance Use Topics    Alcohol use: No     Comment: recovering alcoholic since 1985    Drug use: No        Medications:    ondansetron (ZOFRAN ODT) 4 MG ODT tab  Ascorbic Acid (VITAMIN C) 500 MG CAPS  escitalopram (LEXAPRO) 20 MG tablet  melatonin 3 MG tablet  multivitamin (CENTRUM SILVER) tablet  PRILOSEC OTC OR          Review of Systems  A medically appropriate review of systems was performed with pertinent positives and negatives noted in the HPI, and all other systems  "negative.    Physical Exam   Patient Vitals for the past 24 hrs:   BP Pulse Resp SpO2 Height Weight   04/06/24 0900 (!) 176/76 88 17 96 % -- --   04/06/24 0817 (!) 205/99 99 18 95 % 1.575 m (5' 2\") 61.2 kg (135 lb)          Physical Exam  General: alert and in no acute distress on arrival  Head: atraumatic, normocephalic  Lungs:  nonlabored  CV:  extremities warm and perfused  Abd: nondistended  Skin: no rashes, no diaphoresis and skin color normal  Neuro: Patient awake, alert, speech is fluent,   Psychiatric: affect/mood normal,        ED Course                 Procedures  EKG, reviewed by myself shows normal sinus rhythm.  Rate 97 bpm.  Nonspecific ZY-X-diygeak changes.  No acute ischemic appearing changes.                         Results for orders placed or performed during the hospital encounter of 04/06/24 (from the past 24 hour(s))   CBC with platelets differential    Narrative    The following orders were created for panel order CBC with platelets differential.  Procedure                               Abnormality         Status                     ---------                               -----------         ------                     CBC with platelets and d...[[id number]]  Abnormal            Final result                 Please view results for these tests on the individual orders.   Basic metabolic panel   Result Value Ref Range    Sodium 137 135 - 145 mmol/L    Potassium 4.0 3.4 - 5.3 mmol/L    Chloride 101 98 - 107 mmol/L    Carbon Dioxide (CO2) 25 22 - 29 mmol/L    Anion Gap 11 7 - 15 mmol/L    Urea Nitrogen 17.2 8.0 - 23.0 mg/dL    Creatinine 0.63 0.51 - 0.95 mg/dL    GFR Estimate >90 >60 mL/min/1.73m2    Calcium 9.0 8.8 - 10.2 mg/dL    Glucose 157 (H) 70 - 99 mg/dL   Magnesium   Result Value Ref Range    Magnesium 1.7 1.7 - 2.3 mg/dL   Hepatic function panel   Result Value Ref Range    Protein Total 7.0 6.4 - 8.3 g/dL    Albumin 4.5 3.5 - 5.2 g/dL    Bilirubin Total 0.7 <=1.2 mg/dL    Alkaline " Phosphatase 65 40 - 150 U/L    AST 30 0 - 45 U/L    ALT 25 0 - 50 U/L    Bilirubin Direct <0.20 0.00 - 0.30 mg/dL   Lipase   Result Value Ref Range    Lipase 23 13 - 60 U/L   Imogene Draw    Narrative    The following orders were created for panel order Imogene Draw.  Procedure                               Abnormality         Status                     ---------                               -----------         ------                     Extra Blue Top Tube[[id number]]                              Final result                 Please view results for these tests on the individual orders.   CBC with platelets and differential   Result Value Ref Range    WBC Count 10.5 4.0 - 11.0 10e3/uL    RBC Count 4.06 3.80 - 5.20 10e6/uL    Hemoglobin 12.8 11.7 - 15.7 g/dL    Hematocrit 37.0 35.0 - 47.0 %    MCV 91 78 - 100 fL    MCH 31.5 26.5 - 33.0 pg    MCHC 34.6 31.5 - 36.5 g/dL    RDW 11.5 10.0 - 15.0 %    Platelet Count 226 150 - 450 10e3/uL    % Neutrophils 92 %    % Lymphocytes 6 %    % Monocytes 3 %    % Eosinophils 0 %    % Basophils 0 %    % Immature Granulocytes 0 %    NRBCs per 100 WBC 0 <1 /100    Absolute Neutrophils 9.6 (H) 1.6 - 8.3 10e3/uL    Absolute Lymphocytes 0.6 (L) 0.8 - 5.3 10e3/uL    Absolute Monocytes 0.3 0.0 - 1.3 10e3/uL    Absolute Eosinophils 0.0 0.0 - 0.7 10e3/uL    Absolute Basophils 0.0 0.0 - 0.2 10e3/uL    Absolute Immature Granulocytes 0.0 <=0.4 10e3/uL    Absolute NRBCs 0.0 10e3/uL   Extra Blue Top Tube   Result Value Ref Range    Hold Specimen JIC        MEDICATIONS GIVEN IN THE EMERGENCY DEPARTMENT:  Medications   ondansetron (ZOFRAN) injection 4 mg (4 mg Intravenous $Given 4/6/24 8624)   lactated ringers BOLUS 1,000 mL (0 mLs Intravenous Stopped 4/6/24 1347)           Independent Interpretation (X-rays, CTs, rhythm strip):  None    Consultations/Discussion of Management or Tests:         Social Determinants of Health affecting care:         Assessments & Plan (with Medical Decision  Making)  79 year old female who presents to the Emergency Department for evaluation of nausea, vomiting, and diarrhea.  Also with some palpitations that began during the overnight hours.  Patient arrives afebrile, hypertensive, borderline tachycardia.  EKG normal.  No chest pain.  Do not feel that this represents cardiac etiology.  Likely GI etiology of patient's symptoms.  Labs are performed.  Zofran given.    Patient with no significant abdominal pains.  She has unremarkable exam, with no further episodes of vomiting.  Laboratory workup shows no significant laboratory abnormalities.  At this point, I feel it is reasonable for discharge home, and follow-up in clinic as needed.  Return instructions discussed if new or worsening symptoms develop.  Patient is comfortable with this plan.  Outpatient Zofran ordered.       I have reviewed the nursing notes.    I have reviewed the findings, diagnosis, plan and need for follow up with the patient.       Critical Care time:  none      NEW PRESCRIPTIONS STARTED AT TODAY'S ER VISIT  New Prescriptions    ONDANSETRON (ZOFRAN ODT) 4 MG ODT TAB    Take 1 tablet (4 mg) by mouth every 8 hours as needed for nausea       Final diagnoses:   Nausea vomiting and diarrhea       4/6/2024   [facility] EMERGENCY DEPT       Donnie Gonzalez MD  04/06/24 1447

## 2024-04-06 NOTE — ED TRIAGE NOTES
Pt arrives from independent living with c/o n/v/d feelings of heart racing that started last night. Pt states when she lies flat she feeling like her heart is beating fast. Pt had 2 episodes of emesis 3 episodes of diarrhea.      Triage Assessment (Adult)       Row Name 04/06/24 0819          Triage Assessment    Airway WDL WDL        Respiratory WDL    Respiratory WDL WDL        Skin Circulation/Temperature WDL    Skin Circulation/Temperature WDL WDL        Cardiac WDL    Cardiac WDL X  palpitations        Peripheral/Neurovascular WDL    Peripheral Neurovascular WDL WDL        Cognitive/Neuro/Behavioral WDL    Cognitive/Neuro/Behavioral WDL WDL

## 2024-05-02 ENCOUNTER — OFFICE VISIT (OUTPATIENT)
Dept: FAMILY MEDICINE | Facility: CLINIC | Age: 79
End: 2024-05-02
Payer: COMMERCIAL

## 2024-05-02 VITALS
SYSTOLIC BLOOD PRESSURE: 180 MMHG | RESPIRATION RATE: 18 BRPM | HEIGHT: 62 IN | BODY MASS INDEX: 23.92 KG/M2 | TEMPERATURE: 98.1 F | WEIGHT: 130 LBS | HEART RATE: 64 BPM | DIASTOLIC BLOOD PRESSURE: 98 MMHG | OXYGEN SATURATION: 98 %

## 2024-05-02 DIAGNOSIS — F10.21 ALCOHOLISM IN RECOVERY (H): ICD-10-CM

## 2024-05-02 DIAGNOSIS — F32.5 DEPRESSION, MAJOR, IN REMISSION (H): ICD-10-CM

## 2024-05-02 DIAGNOSIS — I10 BENIGN ESSENTIAL HYPERTENSION: Primary | ICD-10-CM

## 2024-05-02 PROCEDURE — 99214 OFFICE O/P EST MOD 30 MIN: CPT | Performed by: PHYSICIAN ASSISTANT

## 2024-05-02 PROCEDURE — G2211 COMPLEX E/M VISIT ADD ON: HCPCS | Performed by: PHYSICIAN ASSISTANT

## 2024-05-02 PROCEDURE — 96127 BRIEF EMOTIONAL/BEHAV ASSMT: CPT | Performed by: PHYSICIAN ASSISTANT

## 2024-05-02 RX ORDER — AMLODIPINE BESYLATE 2.5 MG/1
TABLET ORAL
Qty: 60 TABLET | Refills: 0 | Status: SHIPPED | OUTPATIENT
Start: 2024-05-02 | End: 2024-05-29

## 2024-05-02 ASSESSMENT — PATIENT HEALTH QUESTIONNAIRE - PHQ9
SUM OF ALL RESPONSES TO PHQ QUESTIONS 1-9: 4
10. IF YOU CHECKED OFF ANY PROBLEMS, HOW DIFFICULT HAVE THESE PROBLEMS MADE IT FOR YOU TO DO YOUR WORK, TAKE CARE OF THINGS AT HOME, OR GET ALONG WITH OTHER PEOPLE: SOMEWHAT DIFFICULT
SUM OF ALL RESPONSES TO PHQ QUESTIONS 1-9: 4

## 2024-05-02 ASSESSMENT — PAIN SCALES - GENERAL: PAINLEVEL: NO PAIN (0)

## 2024-05-02 NOTE — PROGRESS NOTES
"  Assessment & Plan     Benign essential hypertension  Uncontrolled, begin treating.    - amLODIPine (NORVASC) 2.5 MG tablet; Start at 1 tab (2.5 mg) daily but increase to 2 tab daily if not at goal (at least under 150/90, prefer under 140/90) when see clinic nurse.    Depression, major, in remission (H24)  Doing well    Alcoholism in recovery (H)  Doing well x decades    MED REC REQUIRED  Post Medication Reconciliation Status: discharge medications reconciled, continue medications without change    Patient Instructions   Start the medication   Start checking BP 1x daily  In about 2 wks bring list of home BPs, and your BP cuff, to appt with nurse.  Will adjust meds at that time if needed.    If any troubles meanwhile, feel free to call.    Amisha Mason is a 79 year old, presenting for the following health issues:  emergency room follow up and Hypertension        5/2/2024     1:43 PM   Additional Questions   Roomed by elle yoo cma     HPI     ED/UC Followup:  Facility:  [facility] Emergency Dept  Date of visit: 4/6/2024 (1 hours)   Reason for visit: Nausea vomiting and diarrhea   Current Status: improving   Hypertension     Do you check your blood pressure regularly outside of the clinic? Yes   Are you following a low salt diet? No  Are your blood pressures ever more than 140 on the top number (systolic) OR more   than 90 on the bottom number (diastolic), for example 140/90? Yes    After ED, had 3 days of severe diarrhea.  Felt crummy overall for about 1 wk.    Here to recheck her BP.  Was over 200 systolic in ambulance, 180-190 systolic in ED.  Doesn't check her BP often at home because it is generally around 150-156/xx and pulse \"ok\" whenever checked at home.  Feels like hears someone typing/clicking in her ear.          Objective    BP (!) 180/98   Pulse 64   Temp 98.1  F (36.7  C) (Tympanic)   Resp 18   Ht 1.575 m (5' 2.01\")   Wt 59 kg (130 lb)   SpO2 98%   BMI 23.77 kg/m    Body " mass index is 23.77 kg/m .    Signed Electronically by: Susana Taylor PA-C    Answers submitted by the patient for this visit:  Patient Health Questionnaire (Submitted on 5/2/2024)  If you checked off any problems, how difficult have these problems made it for you to do your work, take care of things at home, or get along with other people?: Somewhat difficult  PHQ9 TOTAL SCORE: 4

## 2024-05-02 NOTE — PATIENT INSTRUCTIONS
Start the medication   Start checking BP 1x daily  In about 2 wks bring list of home BPs, and your BP cuff, to appt with nurse.  Will adjust meds at that time if needed.    If any troubles meanwhile, feel free to call.   66 y/o F poor historian w PMHx of etoh abuse, bib friend for ams, difficulty ambulating, not caring for herself x 2 wks. ICU consulted for concerns of Wernicke encephalopathy.       NEURO   #Wernicke Encephalopathy   Patient with confusion, as per friend unsteadiness. Concern for Korsakoff in the setting hallucinations.   -Thiamine 500 mg IV x 5 day  -Monitor FSG  -neurology consult    #Alcohol Intoxication   Upon admission Alcohol level <10.  For the last days friend has probably provided 1-2 beers per day to avoid withdrawal. Patient previously was consume 6 beers per day. Friend unsure if patient has history of seizures, intubation, previous alcohol withdrawals nor esophageal bleeds.   -c/w CIWA score  -c/w Ativan   -obtain Urine toxicology      #Alcohol use disorder  Patient with long standing history of alcohol use disorder.  -Thiamine 500 mg IV x 5 day  -Folic acid 100 mg PO       PULM  #No active issues      CARDIO  #Tachycardia   Patient in the 108, likely in the setting of pain +/- possible withdrawal.   -c/w to monitor  -address pain and withdrawal    GI  #No active issues      RENAL  #No active issues        #No active issues     HEME/ONC  #Macrocytosis   MCV 1003 w/ normal Hgb 13.5, likely in agusto setting of alcohol withdrawal  - B12, folate and TSH     ENDO  #No active issues      ID  #Sacral ulcer  No concern for infection, likely stage 2  in the setting of decreased mobility.   -wound care consult  -multivitamin  -Vit C and Zinc     MSK  #Back pain   patient reporting back pain and since Saturday found down with decreased mobility.   -consult ortho for further imaging     MISCELLANEOUS  F: s/p 1L NS   E: K >4, Mag >2, Phos >3  D: NPO for now  DVT PPx: recommend Lovenox 40 mg SQ   GI PPx: None   Dispo: pending Neurology consult a 66 y/o F poor historian w PMHx of etoh abuse, bib friend for ams, difficulty ambulating, not caring for herself x 2 wks. ICU consulted for concerns of Wernicke encephalopathy.       NEURO   #Wernicke Encephalopathy   Patient with confusion, as per friend unsteadiness. Concern for Korsakoff in the setting hallucinations.   -F/u TH   -Thiamine 500 mg IV x 5 day  -Monitor FSG  -neurology consult    #Alcohol Intoxication   Upon admission Alcohol level <10.  For the last days friend has probably provided 1-2 beers per day to avoid withdrawal. Patient previously was consume 6 beers per day. Friend unsure if patient has history of seizures, intubation, previous alcohol withdrawals nor esophageal bleeds.   -c/w CIWA score  -c/w Ativan   -obtain Urine toxicology      #Alcohol use disorder  Patient with long standing history of alcohol use disorder.  -Thiamine 500 mg IV x 5 day  -Folic acid 100 mg PO       PULM  #No active issues      CARDIO  #Tachycardia   Patient in the 108, likely in the setting of pain +/- possible withdrawal.   -c/w to monitor  -address pain and withdrawal    GI  #No active issues      RENAL  #Hypokalemia  Upon admission K 2.8 and Mag 1.4. At the Ed started PO and IV K repletion, with Mag 2 gram IV. No EKG changes for hypokalemia.   -repeat BMP q4-6      #No active issues     HEME/ONC  #Macrocytosis   MCV 1003 w/ normal Hgb 13.5, likely in agusto setting of alcohol withdrawal  - B12, folate and TSH     ENDO  #No active issues      ID  #Sacral ulcer  No concern for infection, likely stage 2  in the setting of decreased mobility.   -wound care consult  -multivitamin  -Vit C and Zinc     MSK  #Back pain   patient reporting back pain and since Saturday found down with decreased mobility.   -consult ortho for further imaging     MISCELLANEOUS  F: s/p 1L NS   E: K >4, Mag >2, Phos >3  D: NPO for now  DVT PPx: recommend Lovenox 40 mg SQ   GI PPx: None   Dispo: pending Neurology consult a 64 y/o F poor historian w PMHx of etoh abuse, bib friend for ams, difficulty ambulating, not caring for herself x 2 wks. ICU consulted for concerns of Wernicke encephalopathy.       NEURO   #Encephalopathy   Patient with confusion, as per friend unsteadiness. Concern for Wernecke/Korsakoff in the setting hallucinations. R/o herpes encephalitis, syphilis.   -F/u CTH  -Thiamine 500 mg IV x 5 day  -Monitor FSG  -neurology consult  -HSV 1/2, VDRL, HIV, hep C     #Alcohol Intoxication   Upon admission Alcohol level <10.  For the last days friend has probably provided 1-2 beers per day to avoid withdrawal. Patient previously was consume 6 beers per day. Friend unsure if patient has history of seizures, intubation, previous alcohol withdrawals nor esophageal bleeds.   -c/w CIWA score  -fall precautions  -NPO for aspiration precautions in the setting of altered mental status   -obtain Urine toxicology      #Alcohol use disorder  Patient with long standing history of alcohol use disorder.  -Thiamine 500 mg IV x 5 day  -Folic acid 100 mg PO       PULM  #No active issues      CARDIO  #Tachycardia   Patient in the 108, likely in the setting of pain +/- possible withdrawal.   -c/w to monitor  -address pain and withdrawal    #Diffuse T wave inversion   At the ED trop negative x1, unknown cardiovascular history. As per friend not taking any medications.  -repeat EKG in the morning  -consider trend troponin   -lipid panel   -proBNP     GI  #No active issues      RENAL  #Hypokalemia  Upon admission K 2.8 and Mag 1.4. At the Ed started PO and IV K repletion, with Mag 2 gram IV. No EKG changes for hypokalemia, mild prolongation of the QTc.  -repeat BMP q4-6  -c/w aggressive repletion       #r/o retention  -Bladder scan   -monitor I&O     HEME/ONC  #Macrocytosis   MCV 1003 w/ normal Hgb 13.5, likely in the setting of alcohol withdrawal  - B12, folate and TSH     ENDO  #No active issues      ID  #Sacral ulcer  No concern for infection, likely stage 2  in the setting of decreased mobility.   -wound care consult  -multivitamin  -Vit C and Zinc     MSK  #Back pain   patient reporting back pain and since Saturday found down with decreased mobility.   -consult ortho for further imaging     MISCELLANEOUS  F: s/p 1L NS   E: K >4, Mag >2, Phos >3  D: NPO for now  DVT PPx: recommend Lovenox 40 mg SQ   GI PPx: None   Dispo: pending Neurology consult a 65F poor historian PMHx of EtoH abuse (6 beers daily), MDD bib friend/roommate for AMS, difficulty ambulating, inability of caring for herself x 2 wks. As per friend patient feel 1 month ago, with trauma to the head but unsure if the patient seeked medical attention at that time although he told her to do so. Friend noted that last Sunday he found pt on ground of her apartment (thinks she fell but not sure) pt did not let him help her stating she just wanted to stretch so he left for work but when he returned 6+ hrs later she had not moved from that position. At that time he picked her up and placed on bed and since that event pt has been predominantly bedbound incontinent of bladder/bowel as not able to move 2/2 back pain and LE weakness. When asked why pt was not brought to ED for evaluation till now he said "called her family (brother) but they were not available to take pt to ED and friend could not either as he was working and had time off today so brought pt to ED today". Friend state since the weekend pt has been progressively altered, not eating or taking care of herself. He has been giving her approx 1-2 beers daily to "prevent alcohol withdraw."  Friend also reports patient has been confused and altered saying her mother is still alive, and saying people are around the house although there is none X 2wks, getting progressively worse. As per the ED provider, patient initially was AAO x 2, no tongue fasiculations, or tremors. + Unsteady on feet and unable to ambulate. ICU consulted for encephalopathy and inability to ambulate 2/2 LE weakness.     Recommended     #Encephalopathy. ? Metabolic vs Toxic   DDX: ICH s/p fall vs Infx (HSV encephalopathy vs Siphilis vs UTI) vs Toxin/Medication induced (do know if pt takes meds at home) but pt w/ history of MDD per friend vs Hydrocephalus vs Wernecke/Korsakoff encephalopathy in the setting of chronic EtOH use    -Acyclovir for HSV encephalopathy  -f/u CTA H/C spine   -obtain Utox, UA w/ reflex   -obtain HSV 1/2, VDRL, HIV, hep C   -Thiamine 500 mg IV x 5 day  -Monitor FSG q6hrs  -Neurology consult    #Fall  #Inability to ambulate   #LE weakness   #Back pain   -CTH/ c-spine   -c-color   -CT T/L spine   -Neuro consult   -B12, folate, TSH     #R/o Alcohol Withdrawal.   Upon admission Alcohol level <10.  CIWA score 0 at the time of assessment by ICU team, s/p Ativan 1mg for agitation. For the last days friend has probably provided 1-2 beers per day to avoid withdrawal. Patient previously was consume 6 beers per day. Friend unsure if patient has history of seizures, intubation, previous alcohol withdrawals nor esophageal bleeds.   -c/w CIWA monitoring   -PPI ppx IV while NPO  -fall precautions  -NPO for aspiration precautions in the setting of altered mental status   -obtain Urine toxicology      #Alcohol use disorder  Patient with long standing history of alcohol use disorder.  -Thiamine 500 mg IV x 5 day  -Folic acid 100 mg PO     #MDD  -med rec in am       PULM  #No active issues      CARDIO  #Tachycardia   Patient in the 108, likely in the setting of pain +/- possible withdrawal.   - IVF PRN (consider mIVF 100cc/hr)  -obtain UA to eval spec grav for dehydration   -c/w to monitor  -address pain and withdrawal    #Diffuse T wave inversion   At the ED trop negative x1, unknown cardiovascular history. As per friend not taking any medications.  -repeat EKG in the morning  -consider trend troponin   -lipid panel   -proBNP     #Dizziness   #Fall ? etiology   -TTE  -Orthostatic BP once able/pt stable     GI  #Decreased PO intake   #Malnutrition   -Nutrition eval in am    RENAL  #Hypokalemia  Upon admission K 2.8 and Mag 1.4. At the Ed started PO and IV K repletion, with Mag 2 gram IV. No EKG changes for hypokalemia, mild prolongation of the QTc.  -repeat BMP q4-6  -c/w aggressive repletion       #r/o retention  -Bladder scan   -monitor I&O     HEME/ONC  #Macrocytosis   MCV 1003 w/ normal Hgb 13.5, likely in the setting of alcohol withdrawal  - B12, folate and TSH     ENDO  #FS q6hrs      ID  #Sacral ulcer  No concern for infection, likely stage 2  in the setting of decreased mobility.   -wound care consult  -multivitamin  -Vit C and Zinc     MSK  #Fall  #Inability to ambulate   #LE weakness   -obtain CK to r/o rhabdo  -obtain UA   -CTH/ c-spine   -c-color   -CT T/L spine   -Neuro consult   -B12, folate, TSH       DISPO: pending spinal imaging, neuro recs     Case discussed w/ ICU attending. See attending attestation for final recs   Recommendations discussed w/ ED PA and covering ED provider      65F poor historian PMHx of EtoH abuse (6 beers daily), MDD bib friend/roommate for AMS, difficulty ambulating, inability of caring for herself x 2 wks. As per friend patient feel 1 month ago, with trauma to the head but unsure if the patient seeked medical attention at that time although he told her to do so. Friend noted that last Sunday he found pt on ground of her apartment (thinks she fell but not sure) pt did not let him help her stating she just wanted to stretch so he left for work but when he returned 6+ hrs later she had not moved from that position. At that time he picked her up and placed on bed and since that event pt has been predominantly bedbound incontinent of bladder/bowel as not able to move 2/2 back pain and LE weakness. When asked why pt was not brought to ED for evaluation till now he said "called her family (brother) but they were not available to take pt to ED and friend could not either as he was working and had time off today so brought pt to ED today". Friend state since the weekend pt has been progressively altered, not eating or taking care of herself. He has been giving her approx 1-2 beers daily to "prevent alcohol withdraw."  Friend also reports patient has been confused and altered saying her mother is still alive, and saying people are around the house although there is none X 2wks, getting progressively worse. As per the ED provider, patient initially was AAO x 2, no tongue fasiculations, or tremors. + Unsteady on feet and unable to ambulate. ICU consulted for encephalopathy and inability to ambulate 2/2 LE weakness.     Recommended     #Encephalopathy. ? Metabolic vs Toxic   DDX: ICH s/p fall vs Infx (HSV encephalopathy vs Siphilis vs UTI) vs Toxin/Medication induced (do know if pt takes meds at home) but pt w/ history of MDD per friend vs Hydrocephalus vs Wernecke/Korsakoff encephalopathy in the setting of chronic EtOH use    -Acyclovir for HSV encephalopathy  -f/u CTA H/C spine   -obtain Utox, UA w/ reflex   -obtain HSV 1/2, VDRL, HIV, hep C   -Thiamine 500 mg IV x 5 day  -Monitor FSG q6hrs  -Neurology consult    #Fall  #Inability to ambulate   #LE weakness   #Back pain   -CTH/ c-spine   -c-color   -CT T/L spine   -Neuro consult   -B12, folate, TSH     #R/o Alcohol Withdrawal.   Upon admission Alcohol level <10.  CIWA score 0 at the time of assessment by ICU team, s/p Ativan 1mg for agitation. For the last days friend has probably provided 1-2 beers per day to avoid withdrawal. Patient previously was consume 6 beers per day. Friend unsure if patient has history of seizures, intubation, previous alcohol withdrawals nor esophageal bleeds.   -c/w CIWA monitoring   -PPI ppx IV while NPO  -fall precautions  -NPO for aspiration precautions in the setting of altered mental status   -obtain Urine toxicology      #Alcohol use disorder  Patient with long standing history of alcohol use disorder.  -Thiamine 500 mg IV x 5 day  -Folic acid 100 mg PO     #MDD  -med rec in am       PULM  #No active issues      CARDIO  #Tachycardia   Patient in the 108, likely in the setting of pain +/- possible withdrawal.   - IVF PRN (consider mIVF 100cc/hr)  -obtain UA to eval spec grav for dehydration   -c/w to monitor  -address pain and withdrawal    #Diffuse T wave inversion   At the ED trop negative x1, unknown cardiovascular history. As per friend not taking any medications.  -repeat EKG in the morning  -consider trend troponin   -lipid panel   -proBNP     #Dizziness   #Fall ? etiology   -TTE  -Orthostatic BP once able/pt stable     GI  #Decreased PO intake   #Malnutrition   -Nutrition eval in am    RENAL  #Hypokalemia  #HypoMg  Upon admission K 2.8 and Mag 1.4. At the Ed started PO and IV K repletion, with Mag 2 gram IV. No EKG changes for hypokalemia, mild prolongation of the QTc.  -repeat BMP q4-6  -c/w aggressive repletion   -check phosphate         #r/o retention  -Bladder scan   -monitor I&O     HEME/ONC  #Macrocytosis   MCV 1003 w/ normal Hgb 13.5, likely in the setting of alcohol withdrawal  - B12, folate and TSH     ENDO  #FS q6hrs      ID  #Sacral ulcer  No concern for infection, likely stage 2  in the setting of decreased mobility.   -wound care consult  -multivitamin  -Vit C and Zinc     MSK  #Fall  #Inability to ambulate   #LE weakness   -obtain CK to r/o rhabdo  -obtain UA   -CTH/ c-spine   -c-color   -CT T/L spine   -Neuro consult   -B12, folate, TSH       DISPO: pending spinal imaging, neuro recs     Case discussed w/ ICU attending. See attending attestation for final recs   Recommendations discussed w/ ED PA and covering ED provider      65F poor historian PMHx of EtoH abuse (6 beers daily), MDD bib friend/roommate for AMS, difficulty ambulating and inability of caring for herself s/p recent falls. ICU consulted for encephalopathy and inability to ambulate 2/2 LE weakness.     Recommended     #Encephalopathy. ? Metabolic vs Toxic   DDX: ICH s/p fall vs Infx (HSV encephalopathy vs Siphilis vs UTI) vs Toxin/Medication induced (do know if pt takes meds at home) but pt w/ history of MDD per friend vs Hydrocephalus vs Wernecke/Korsakoff encephalopathy in the setting of chronic EtOH use    -Acyclovir for HSV encephalopathy  -f/u CTA H/C spine   -obtain Utox, UA w/ reflex   -obtain HSV 1/2, VDRL, HIV, hep C   -Thiamine 500 mg IV x 5 day  -Monitor FSG q6hrs  -Neurology consult    #Fall  #Inability to ambulate   #LE weakness   #Back pain   -CTH/ c-spine   -c-color   -CT T/L spine   -Neuro consult   -B12, folate, TSH     #R/o Alcohol Withdrawal.   Upon admission Alcohol level <10.  CIWA score 0 at the time of assessment by ICU team, s/p Ativan 1mg for agitation. For the last days friend has probably provided 1-2 beers per day to avoid withdrawal. Patient previously was consume 6 beers per day. Friend unsure if patient has history of seizures, intubation, previous alcohol withdrawals nor esophageal bleeds.   -c/w CIWA monitoring   -PPI ppx IV while NPO  -fall precautions  -NPO for aspiration precautions in the setting of altered mental status   -obtain Urine toxicology      #Alcohol use disorder  Patient with long standing history of alcohol use disorder.  -Thiamine 500 mg IV x 5 day  -Folic acid 100 mg PO     #MDD  -med rec in am       PULM  #No active issues      CARDIO  #Tachycardia   Patient in the 108, likely in the setting of pain +/- possible withdrawal.   - IVF PRN (consider mIVF 100cc/hr)  -obtain UA to eval spec grav for dehydration   -c/w to monitor  -address pain and withdrawal    #Diffuse T wave inversion   At the ED trop negative x1, unknown cardiovascular history. As per friend not taking any medications.  -repeat EKG in the morning  -consider trend troponin   -lipid panel   -proBNP     #Dizziness   #Fall ? etiology   -TTE  -Orthostatic BP once able/pt stable     GI  #Decreased PO intake   #Malnutrition   -Nutrition eval in am    RENAL  #Hypokalemia  #HypoMg  Upon admission K 2.8 and Mag 1.4. At the Ed started PO and IV K repletion, with Mag 2 gram IV. No EKG changes for hypokalemia, mild prolongation of the QTc.  -repeat BMP q4-6  -c/w aggressive repletion   -check phosphate         #r/o retention  -Bladder scan   -monitor I&O     HEME/ONC  #Macrocytosis   MCV 1003 w/ normal Hgb 13.5, likely in the setting of alcohol withdrawal  - B12, folate and TSH     ENDO  #FS q6hrs      ID  #Sacral ulcer  No concern for infection, likely stage 2  in the setting of decreased mobility.   -wound care consult  -multivitamin  -Vit C and Zinc     MSK  #Fall  #Inability to ambulate   #LE weakness   -obtain CK to r/o rhabdo  -obtain UA   -CTH/ c-spine   -c-color   -CT T/L spine   -Neuro consult   -B12, folate, TSH       DISPO: pending spinal imaging, neuro recs     Case discussed w/ ICU attending. See attending attestation for final recs   Recommendations discussed w/ ED PA and covering ED provider      65F poor historian PMHx of EtoH abuse (6 beers daily), MDD bib friend/roommate for AMS, difficulty ambulating and inability of caring for herself s/p recent falls. ICU consulted for encephalopathy and inability to ambulate 2/2 LE weakness.     Recommended     #Encephalopathy. ? Metabolic vs Toxic  DDX: ICH s/p fall vs Infx (HSV encephalopathy vs Siphilis vs UTI) vs Toxin/Medication induced (do know if pt takes meds at home) but pt w/ history of MDD per friend vs Vascular dementia vs Hydrocephalus vs Wernecke/Korsakoff encephalopathy in the setting of chronic EtOH use vs Dehydration   -Acyclovir for HSV encephalopathy  -f/u CTH, CTA H/C spine   -obtain Utox, UA w/ reflex   -obtain HSV 1/2, VDRL, HIV, hep C  -Thiamine 500 mg IV x 5 day  -Monitor FSG q6hrs  -Neurology consult    #Fall  #Inability to ambulate   #LE weakness   #Back pain   -CTH/ c-spine   -c-color   -CT T/L spine   -Neuro consult   -B12, folate, TSH     #R/o Alcohol Withdrawal.   Upon admission Alcohol level <10.  CIWA score 0 at the time of assessment by ICU team, s/p Ativan 1mg for agitation. For the last days friend has probably provided 1-2 beers per day to avoid withdrawal. Patient consumes 6 beers per day at baseline. Friend unsure if patient has history of seizures, intubation, previous alcohol withdrawals nor esophageal bleeds.   -c/w CIWA monitoring   -PPI ppx IV while NPO  -fall precautions  -NPO for aspiration precautions in the setting of altered mental status   -obtain Urine toxicology      #Alcohol use disorder  Patient with long standing history of alcohol use disorder.  -Thiamine 500 mg IV x 5 day  -Folic acid 100 mg PO, MV    #MDD  -med rec in am       PULM  #No active issues      CARDIO  #Tachycardia   Patient in the 108, likely in the setting of pain +/- possible withdrawal.   - IVF PRN (consider mIVF 100cc/hr)  -obtain UA to eval spec grav for dehydration   -c/w to monitor  -address pain and withdrawal    #Diffuse T wave inversion   Trop negative x1, unknown cardiovascular history. As per friend not taking any medications.  -repeat EKG in the morning  -consider trending troponin   -lipid panel   -proBNP     #Dizziness   #Fall ? etiology   -TTE  -Orthostatic BP once able/pt stable     GI  #Decreased PO intake   #Malnutrition   -Nutrition eval in am  -SW consult    RENAL  #Hypokalemia  #HypoMg  Upon admission K 2.8 and Mag 1.4. At the Ed started PO and IV K repletion, with Mag 2 gram IV. No EKG changes for hypokalemia, mild prolongation of the QTc.  -repeat BMP q4-6  -c/w aggressive repletion   -check phosphate         #r/o retention  -Bladder scan   -monitor I&O     HEME/ONC  #Macrocytosis   MCV 1003 w/ normal Hgb 13.5, likely in the setting of alcohol withdrawal  - B12, folate and TSH     ENDO  #FS q6hrs      ID  #Sacral ulcer  No concern for infection, likely stage 2  in the setting of decreased mobility.   -wound care consult  -multivitamin  -Vit C and Zinc     MSK  #Fall  #Inability to ambulate   #LE weakness   -obtain CK to r/o rhabdo  -obtain UA   -CTH/ c-spine   -c-color   -CT T/L spine   -Neuro consult   -B12, folate, TSH       DISPO: pending spinal imaging, neuro recs     Case discussed w/ ICU attending. See attending attestation for final recs   Recommendations discussed w/ ED PA and covering ED provider

## 2024-05-14 ENCOUNTER — TELEPHONE (OUTPATIENT)
Dept: FAMILY MEDICINE | Facility: CLINIC | Age: 79
End: 2024-05-14

## 2024-05-14 ENCOUNTER — ALLIED HEALTH/NURSE VISIT (OUTPATIENT)
Dept: FAMILY MEDICINE | Facility: CLINIC | Age: 79
End: 2024-05-14
Payer: COMMERCIAL

## 2024-05-14 VITALS — SYSTOLIC BLOOD PRESSURE: 138 MMHG | OXYGEN SATURATION: 97 % | HEART RATE: 66 BPM | DIASTOLIC BLOOD PRESSURE: 70 MMHG

## 2024-05-14 DIAGNOSIS — Z01.30 BLOOD PRESSURE CHECK: Primary | ICD-10-CM

## 2024-05-14 PROCEDURE — 99207 PR NO CHARGE NURSE ONLY: CPT

## 2024-05-14 NOTE — TELEPHONE ENCOUNTER
BP looks ok and home BP cuff appears accurate.  Monitor at home daily for next 1 week and then update [facility]) on how BP are doing, if still feeling dizzy, and if medication makes her tired.  Notify sooner if feeling at risk of falls or if worsening symptoms.

## 2024-05-14 NOTE — TELEPHONE ENCOUNTER
Isabella Acosta is a 79 year old year old patient who comes in today for a Blood Pressure check because of new medication .  Vital Signs as repeated by RN     5/14/2024 2:14 PM 5/14/2024 2:25 PM   /71 138/70   BP Location Right arm Right arm   Patient Position Sitting Sitting   Cuff Size Adult Regular Adult Regular   Pulse 66 66   SpO2 97 % 97 %         Patient is taking medication as prescribed  Patient is tolerating medications well.  Patient is monitoring Blood Pressure at home.  Average readings if yes are 153/67, 148/67, 135/67, 135/60     2:22 pm patient's home BP cuff 155/72 pulse 68    Current complaints: some dizziness and started taking amlodipine at night because it makes her feel tired.   Disposition:  patient to continue with the same medication     Gavi Machuca RN on 5/14/2024 at 2:28 PM

## 2024-05-14 NOTE — TELEPHONE ENCOUNTER
Mailbox is full and cannot take any new messages at this time. Need to recall.    Nishi Waters RN

## 2024-05-28 ENCOUNTER — TELEPHONE (OUTPATIENT)
Dept: FAMILY MEDICINE | Facility: CLINIC | Age: 79
End: 2024-05-28
Payer: COMMERCIAL

## 2024-05-28 DIAGNOSIS — I10 BENIGN ESSENTIAL HYPERTENSION: ICD-10-CM

## 2024-05-28 NOTE — TELEPHONE ENCOUNTER
General Call    Contacts         Type Contact Phone/Fax    05/28/2024 11:35 AM CDT Phone (Incoming) Isabella Acosta (Self) [phone] (H)          Reason for Call:  updating with BP    What are your questions or concerns:  wanting to report her blood pressure    Once a day and these are the most recent readings      05/22/24   147/72 pulse 71  05/23/24   143/81 pulse 71  05/24/24   132/67 pulse 75  05/25/24   136/69 pulse 76  05/26/24   133/67 pulse 74  05/27/24    146/70 pulse 72    She has over 40 readings as well        Date of last appointment with provider:     Okay to leave a detailed message?: Yes at Home number on file [phone] (home)

## 2024-05-29 RX ORDER — AMLODIPINE BESYLATE 2.5 MG/1
TABLET ORAL
Qty: 60 TABLET | Refills: 3 | Status: SHIPPED | OUTPATIENT
Start: 2024-05-29

## 2024-05-29 NOTE — TELEPHONE ENCOUNTER
BPs look good.  Ok to send refill x 1 yr - just need clarification on if she is taking 1 or 2 tabs of amlodipine 2.5 mg.  (If taking 2 tabs, change to 1 tab of 5 mg.)

## 2024-05-29 NOTE — TELEPHONE ENCOUNTER
Patient called she is taking 1 tablet of 2.5mg Amlodipine. She is wanting to know when she should follow up next.     Marina Mitchell RN

## 2024-05-29 NOTE — TELEPHONE ENCOUNTER
Refills sent.  See me next May 2025 - sooner if needed or if she decides to get a medicare wellness visit.

## 2024-10-09 DIAGNOSIS — F32.5 DEPRESSION, MAJOR, IN REMISSION (H): ICD-10-CM

## 2024-10-09 RX ORDER — ESCITALOPRAM OXALATE 20 MG/1
20 TABLET ORAL DAILY
Qty: 90 TABLET | Refills: 0 | Status: SHIPPED | OUTPATIENT
Start: 2024-10-09

## 2025-01-20 DIAGNOSIS — I10 BENIGN ESSENTIAL HYPERTENSION: ICD-10-CM

## 2025-01-20 DIAGNOSIS — F32.5 DEPRESSION, MAJOR, IN REMISSION: ICD-10-CM

## 2025-01-20 RX ORDER — AMLODIPINE BESYLATE 2.5 MG/1
TABLET ORAL
Qty: 90 TABLET | Refills: 0 | Status: SHIPPED | OUTPATIENT
Start: 2025-01-20

## 2025-01-20 RX ORDER — ESCITALOPRAM OXALATE 20 MG/1
TABLET ORAL
Qty: 30 TABLET | Refills: 0 | Status: SHIPPED | OUTPATIENT
Start: 2025-01-20

## 2025-01-20 NOTE — TELEPHONE ENCOUNTER
Needs a refill before appt. On  02/06/25      Next 5 appointments (look out 90 days)      Feb 06, 2025 1:00 PM  (Arrive by 12:40 PM)  Provider Visit with Susana Taylor PA-C  [facility] ([facility] ) [address] [place] [id number]  [phone]

## 2025-01-20 NOTE — TELEPHONE ENCOUNTER
GFR Estimate   Date Value Ref Range Status   04/06/2024 >90 >60 mL/min/1.73m2 Final   12/28/2009 77 >60 mL/min/1.7m2 Final     Potassium   Date Value Ref Range Status   04/06/2024 4.0 3.4 - 5.3 mmol/L Final   12/28/2009 4.5 3.4 - 5.3 mmol/L Final

## 2025-02-06 ENCOUNTER — OFFICE VISIT (OUTPATIENT)
Dept: FAMILY MEDICINE | Facility: CLINIC | Age: 80
End: 2025-02-06
Payer: COMMERCIAL

## 2025-02-06 VITALS
HEART RATE: 76 BPM | RESPIRATION RATE: 18 BRPM | BODY MASS INDEX: 23.92 KG/M2 | TEMPERATURE: 98.3 F | SYSTOLIC BLOOD PRESSURE: 135 MMHG | DIASTOLIC BLOOD PRESSURE: 70 MMHG | HEIGHT: 62 IN | OXYGEN SATURATION: 96 % | WEIGHT: 130 LBS

## 2025-02-06 DIAGNOSIS — I10 BENIGN ESSENTIAL HYPERTENSION: ICD-10-CM

## 2025-02-06 DIAGNOSIS — F32.5 DEPRESSION, MAJOR, IN REMISSION: ICD-10-CM

## 2025-02-06 DIAGNOSIS — Z00.00 ENCOUNTER FOR MEDICARE ANNUAL WELLNESS EXAM: Primary | ICD-10-CM

## 2025-02-06 DIAGNOSIS — F10.21 ALCOHOLISM IN RECOVERY (H): ICD-10-CM

## 2025-02-06 DIAGNOSIS — R73.01 ELEVATED FASTING GLUCOSE: ICD-10-CM

## 2025-02-06 DIAGNOSIS — F41.9 ANXIETY: ICD-10-CM

## 2025-02-06 DIAGNOSIS — E78.5 HYPERLIPIDEMIA LDL GOAL <130: ICD-10-CM

## 2025-02-06 RX ORDER — ESCITALOPRAM OXALATE 20 MG/1
20 TABLET ORAL DAILY
Qty: 90 TABLET | Refills: 3 | Status: SHIPPED | OUTPATIENT
Start: 2025-02-06

## 2025-02-06 RX ORDER — AMLODIPINE BESYLATE 5 MG/1
5 TABLET ORAL DAILY
Qty: 90 TABLET | Refills: 3 | Status: SHIPPED | OUTPATIENT
Start: 2025-02-06

## 2025-02-06 SDOH — HEALTH STABILITY: PHYSICAL HEALTH: ON AVERAGE, HOW MANY MINUTES DO YOU ENGAGE IN EXERCISE AT THIS LEVEL?: 30 MIN

## 2025-02-06 SDOH — HEALTH STABILITY: PHYSICAL HEALTH: ON AVERAGE, HOW MANY DAYS PER WEEK DO YOU ENGAGE IN MODERATE TO STRENUOUS EXERCISE (LIKE A BRISK WALK)?: 7 DAYS

## 2025-02-06 ASSESSMENT — ANXIETY QUESTIONNAIRES
7. FEELING AFRAID AS IF SOMETHING AWFUL MIGHT HAPPEN: NOT AT ALL
3. WORRYING TOO MUCH ABOUT DIFFERENT THINGS: SEVERAL DAYS
6. BECOMING EASILY ANNOYED OR IRRITABLE: SEVERAL DAYS
8. IF YOU CHECKED OFF ANY PROBLEMS, HOW DIFFICULT HAVE THESE MADE IT FOR YOU TO DO YOUR WORK, TAKE CARE OF THINGS AT HOME, OR GET ALONG WITH OTHER PEOPLE?: NOT DIFFICULT AT ALL
4. TROUBLE RELAXING: NOT AT ALL
5. BEING SO RESTLESS THAT IT IS HARD TO SIT STILL: NOT AT ALL
GAD7 TOTAL SCORE: 2
2. NOT BEING ABLE TO STOP OR CONTROL WORRYING: NOT AT ALL
7. FEELING AFRAID AS IF SOMETHING AWFUL MIGHT HAPPEN: NOT AT ALL
GAD7 TOTAL SCORE: 2
GAD7 TOTAL SCORE: 2
IF YOU CHECKED OFF ANY PROBLEMS ON THIS QUESTIONNAIRE, HOW DIFFICULT HAVE THESE PROBLEMS MADE IT FOR YOU TO DO YOUR WORK, TAKE CARE OF THINGS AT HOME, OR GET ALONG WITH OTHER PEOPLE: NOT DIFFICULT AT ALL
1. FEELING NERVOUS, ANXIOUS, OR ON EDGE: NOT AT ALL

## 2025-02-06 ASSESSMENT — PATIENT HEALTH QUESTIONNAIRE - PHQ9
SUM OF ALL RESPONSES TO PHQ QUESTIONS 1-9: 1
SUM OF ALL RESPONSES TO PHQ QUESTIONS 1-9: 1
10. IF YOU CHECKED OFF ANY PROBLEMS, HOW DIFFICULT HAVE THESE PROBLEMS MADE IT FOR YOU TO DO YOUR WORK, TAKE CARE OF THINGS AT HOME, OR GET ALONG WITH OTHER PEOPLE: NOT DIFFICULT AT ALL

## 2025-02-06 ASSESSMENT — PAIN SCALES - GENERAL: PAINLEVEL_OUTOF10: NO PAIN (0)

## 2025-02-06 ASSESSMENT — SOCIAL DETERMINANTS OF HEALTH (SDOH): HOW OFTEN DO YOU GET TOGETHER WITH FRIENDS OR RELATIVES?: MORE THAN THREE TIMES A WEEK

## 2025-02-06 NOTE — PATIENT INSTRUCTIONS
Increased strength of amlodipine pill   BP goal under 150/90 - if not at goal after this medication adjustment, please let me know     If change mind and want to recheck blood sugar, let me know    Finish up the health directive and get us a copy  Patient Education   Preventive Care Advice   This is general advice given by our system to help you stay healthy. However, your care team may have specific advice just for you. Please talk to your care team about your preventive care needs.  Nutrition  Eat 5 or more servings of fruits and vegetables each day.  Try wheat bread, brown rice and whole grain pasta (instead of white bread, rice, and pasta).  Get enough calcium and vitamin D. Check the label on foods and aim for 100% of the RDA (recommended daily allowance).  Lifestyle  Exercise at least 150 minutes each week  (30 minutes a day, 5 days a week).  Do muscle strengthening activities 2 days a week. These help control your weight and prevent disease.  No smoking.  Wear sunscreen to prevent skin cancer.  Have a dental exam and cleaning every 6 months.  Yearly exams  See your health care team every year to talk about:  Any changes in your health.  Any medicines your care team has prescribed.  Preventive care, family planning, and ways to prevent chronic diseases.  Shots (vaccines)   HPV shots (up to age 26), if you've never had them before.  Hepatitis B shots (up to age 59), if you've never had them before.  COVID-19 shot: Get this shot when it's due.  Flu shot: Get a flu shot every year.  Tetanus shot: Get a tetanus shot every 10 years.  Pneumococcal, hepatitis A, and RSV shots: Ask your care team if you need these based on your risk.  Shingles shot (for age 50 and up)  General health tests  Diabetes screening:  Starting at age 35, Get screened for diabetes at least every 3 years.  If you are younger than age 35, ask your care team if you should be screened for diabetes.  Cholesterol test: At age 39, start having a  cholesterol test every 5 years, or more often if advised.  Bone density scan (DEXA): At age 50, ask your care team if you should have this scan for osteoporosis (brittle bones).  Hepatitis C: Get tested at least once in your life.  STIs (sexually transmitted infections)  Before age 24: Ask your care team if you should be screened for STIs.  After age 24: Get screened for STIs if you're at risk. You are at risk for STIs (including HIV) if:  You are sexually active with more than one person.  You don't use condoms every time.  You or a partner was diagnosed with a sexually transmitted infection.  If you are at risk for HIV, ask about PrEP medicine to prevent HIV.  Get tested for HIV at least once in your life, whether you are at risk for HIV or not.  Cancer screening tests  Cervical cancer screening: If you have a cervix, begin getting regular cervical cancer screening tests starting at age 21.  Breast cancer scan (mammogram): If you've ever had breasts, begin having regular mammograms starting at age 40. This is a scan to check for breast cancer.  Colon cancer screening: It is important to start screening for colon cancer at age 45.  Have a colonoscopy test every 10 years (or more often if you're at risk) Or, ask your provider about stool tests like a FIT test every year or Cologuard test every 3 years.  To learn more about your testing options, visit:   .  For help making a decision, visit:   https://bit.ly/gg35040.  Prostate cancer screening test: If you have a prostate, ask your care team if a prostate cancer screening test (PSA) at age 55 is right for you.  Lung cancer screening: If you are a current or former smoker ages 50 to 80, ask your care team if ongoing lung cancer screenings are right for you.  For informational purposes only. Not to replace the advice of your health care provider. Copyright   2023 [employer]. All rights reserved. Clinically reviewed by the [facility] Transitions  Program. [employer] 479300 - REV 01/24.  Hearing Loss: Care Instructions  Overview     Hearing loss is a sudden or slow decrease in how well you hear. It can range from slight to profound. Permanent hearing loss can occur with aging. It also can happen when you are exposed long-term to loud noise. Examples include listening to loud music, riding motorcycles, or being around other loud machines.  Hearing loss can affect your work and home life. It can make you feel lonely or depressed. You may feel that you have lost your independence. But hearing aids and other devices can help you hear better and feel connected to others.  Follow-up care is a key part of your treatment and safety. Be sure to make and go to all appointments, and call your doctor if you are having problems. It's also a good idea to know your test results and keep a list of the medicines you take.  How can you care for yourself at home?  Avoid loud noises whenever possible. This helps keep your hearing from getting worse.  Always wear hearing protection around loud noises.  Wear a hearing aid as directed.  A professional can help you pick a hearing aid that will work best for you.  You can also get hearing aids over the counter for mild to moderate hearing loss.  Have hearing tests as your doctor suggests. They can show whether your hearing has changed. Your hearing aid may need to be adjusted.  Use other devices as needed. These may include:  Telephone amplifiers and hearing aids that can connect to a television, stereo, radio, or microphone.  Devices that use lights or vibrations. These alert you to the doorbell, a ringing telephone, or a baby monitor.  Television closed-captioning. This shows the words at the bottom of the screen. Most new TVs can do this.  TTY (text telephone). This lets you type messages back and forth on the telephone instead of talking or listening. These devices are also called TDD. When messages are typed on the keyboard,  "they are sent over the phone line to a receiving TTY. The message is shown on a monitor.  Use text messaging, social media, and email if it is hard for you to communicate by telephone.  Try to learn a listening technique called speechreading. It is not lipreading. You pay attention to people's gestures, expressions, posture, and tone of voice. These clues can help you understand what a person is saying. Face the person you are talking to, and have them face you. Make sure the lighting is good. You need to see the other person's face clearly.  Think about counseling if you need help to adjust to your hearing loss.  When should you call for help?  Watch closely for changes in your health, and be sure to contact your doctor if:    You think your hearing is getting worse.     You have new symptoms, such as dizziness or nausea.   Where can you learn more?  Go to https://www.[employer].net/patiented  Enter R798 in the search box to learn more about \"Hearing Loss: Care Instructions.\"  Current as of: September 27, 2023  Content Version: 14.3    2024 [employer].   Care instructions adapted under license by your healthcare professional. If you have questions about a medical condition or this instruction, always ask your healthcare professional. [employer] disclaims any warranty or liability for your use of this information.       "

## 2025-02-06 NOTE — PROGRESS NOTES
Preventive Care Visit  [facility]  Susana Taylor PA-C, Family Medicine  Feb 6, 2025      Assessment & Plan     Encounter for Medicare annual wellness exam    Benign essential hypertension  Uncontrolled, dose increase  - amLODIPine (NORVASC) 5 MG tablet; Take 1 tablet (5 mg) by mouth daily.    Hyperlipidemia LDL goal <130  Does not wish to recheck    Elevated fasting glucose  Does not wish to recheck     Depression, major, in remission  Anxiety  Mostly controlled, gets situational anxiety due to her hearing.  Anxiety about changing her medication.  EKG normal in April.  Decided no med change.  - escitalopram (LEXAPRO) 20 MG tablet; Take 1 tablet (20 mg) by mouth daily.    Alcoholism in recovery (H)  Continues to do very well.  States that if she had one, she would want more.    Patient again declines preventative care, cancer screens, vaccines, or rechecks on above conditions    The longitudinal plan of care for the diagnosis(es)/condition(s) as documented were addressed during this visit. Due to the added complexity in care, I will continue to support Isabella in the subsequent management and with ongoing continuity of care.    Counseling  Appropriate preventive services were addressed with this patient via screening, questionnaire, or discussion as appropriate for fall prevention, nutrition, physical activity, Tobacco-use cessation, social engagement, weight loss and cognition.  Checklist reviewing preventive services available has been given to the patient.  Reviewed patient's diet, addressing concerns and/or questions.   The patient was instructed to see the dentist every 6 months.   The patient was provided with written information regarding signs of hearing loss.     Patient Instructions   Increased strength of amlodipine pill   BP goal under 150/90 - if not at goal after this medication adjustment, please let me know     If change mind and want to recheck blood sugar, let  me know    Finish up the health directive and get us a copy  Patient Education  Preventive Care Advice   This is general advice given by our system to help you stay healthy. However, your care team may have specific advice just for you. Please talk to your care team about your preventive care needs.  Nutrition  Eat 5 or more servings of fruits and vegetables each day.  Try wheat bread, brown rice and whole grain pasta (instead of white bread, rice, and pasta).  Get enough calcium and vitamin D. Check the label on foods and aim for 100% of the RDA (recommended daily allowance).  Lifestyle  Exercise at least 150 minutes each week  (30 minutes a day, 5 days a week).  Do muscle strengthening activities 2 days a week. These help control your weight and prevent disease.  No smoking.  Wear sunscreen to prevent skin cancer.  Have a dental exam and cleaning every 6 months.  Yearly exams  See your health care team every year to talk about:  Any changes in your health.  Any medicines your care team has prescribed.  Preventive care, family planning, and ways to prevent chronic diseases.  Shots (vaccines)   HPV shots (up to age 26), if you've never had them before.  Hepatitis B shots (up to age 59), if you've never had them before.  COVID-19 shot: Get this shot when it's due.  Flu shot: Get a flu shot every year.  Tetanus shot: Get a tetanus shot every 10 years.  Pneumococcal, hepatitis A, and RSV shots: Ask your care team if you need these based on your risk.  Shingles shot (for age 50 and up)  General health tests  Diabetes screening:  Starting at age 35, Get screened for diabetes at least every 3 years.  If you are younger than age 35, ask your care team if you should be screened for diabetes.  Cholesterol test: At age 39, start having a cholesterol test every 5 years, or more often if advised.  Bone density scan (DEXA): At age 50, ask your care team if you should have this scan for osteoporosis (brittle bones).  Hepatitis C:  Get tested at least once in your life.  STIs (sexually transmitted infections)  Before age 24: Ask your care team if you should be screened for STIs.  After age 24: Get screened for STIs if you're at risk. You are at risk for STIs (including HIV) if:  You are sexually active with more than one person.  You don't use condoms every time.  You or a partner was diagnosed with a sexually transmitted infection.  If you are at risk for HIV, ask about PrEP medicine to prevent HIV.  Get tested for HIV at least once in your life, whether you are at risk for HIV or not.  Cancer screening tests  Cervical cancer screening: If you have a cervix, begin getting regular cervical cancer screening tests starting at age 21.  Breast cancer scan (mammogram): If you've ever had breasts, begin having regular mammograms starting at age 40. This is a scan to check for breast cancer.  Colon cancer screening: It is important to start screening for colon cancer at age 45.  Have a colonoscopy test every 10 years (or more often if you're at risk) Or, ask your provider about stool tests like a FIT test every year or Cologuard test every 3 years.  To learn more about your testing options, visit:   .  For help making a decision, visit:   https://bit.ly/bx30656.  Prostate cancer screening test: If you have a prostate, ask your care team if a prostate cancer screening test (PSA) at age 55 is right for you.  Lung cancer screening: If you are a current or former smoker ages 50 to 80, ask your care team if ongoing lung cancer screenings are right for you.  For informational purposes only. Not to replace the advice of your health care provider. Copyright   2023 [employer]. All rights reserved. Clinically reviewed by the [facility]. Share0 [id number] - REV 01/24.  Hearing Loss: Care Instructions  Overview     Hearing loss is a sudden or slow decrease in how well you hear. It can range from slight to profound.  Permanent hearing loss can occur with aging. It also can happen when you are exposed long-term to loud noise. Examples include listening to loud music, riding motorcycles, or being around other loud machines.  Hearing loss can affect your work and home life. It can make you feel lonely or depressed. You may feel that you have lost your independence. But hearing aids and other devices can help you hear better and feel connected to others.  Follow-up care is a key part of your treatment and safety. Be sure to make and go to all appointments, and call your doctor if you are having problems. It's also a good idea to know your test results and keep a list of the medicines you take.  How can you care for yourself at home?  Avoid loud noises whenever possible. This helps keep your hearing from getting worse.  Always wear hearing protection around loud noises.  Wear a hearing aid as directed.  A professional can help you pick a hearing aid that will work best for you.  You can also get hearing aids over the counter for mild to moderate hearing loss.  Have hearing tests as your doctor suggests. They can show whether your hearing has changed. Your hearing aid may need to be adjusted.  Use other devices as needed. These may include:  Telephone amplifiers and hearing aids that can connect to a television, stereo, radio, or microphone.  Devices that use lights or vibrations. These alert you to the doorbell, a ringing telephone, or a baby monitor.  Television closed-captioning. This shows the words at the bottom of the screen. Most new TVs can do this.  TTY (text telephone). This lets you type messages back and forth on the telephone instead of talking or listening. These devices are also called TDD. When messages are typed on the keyboard, they are sent over the phone line to a receiving TTY. The message is shown on a monitor.  Use text messaging, social media, and email if it is hard for you to communicate by telephone.  Try to  "learn a listening technique called speechreading. It is not lipreading. You pay attention to people's gestures, expressions, posture, and tone of voice. These clues can help you understand what a person is saying. Face the person you are talking to, and have them face you. Make sure the lighting is good. You need to see the other person's face clearly.  Think about counseling if you need help to adjust to your hearing loss.  When should you call for help?  Watch closely for changes in your health, and be sure to contact your doctor if:    You think your hearing is getting worse.     You have new symptoms, such as dizziness or nausea.   Where can you learn more?  Go to https://www.[employer].net/patiented  Enter R798 in the search box to learn more about \"Hearing Loss: Care Instructions.\"  Current as of: September 27, 2023  Content Version: 14.3    2024 [employer].   Care instructions adapted under license by your healthcare professional. If you have questions about a medical condition or this instruction, always ask your healthcare professional. [employer] disclaims any warranty or liability for your use of this information.         Amisha Mason is a 79 year old, presenting for the following:  Physical, Hypertension, and Depression        2/6/2025    12:47 PM   Additional Questions   Roomed by elle yoo cma     HPI    Hypertension Follow-up  Do you check your blood pressure regularly outside of the clinic? Yes   Are you following a low salt diet? Yes  Are your blood pressures ever more than 140 on the top number (systolic) OR more   than 90 on the bottom number (diastolic), for example 140/90? Yes  Home BPs tend to run in 150s/can't remember.    Depression and Anxiety   How are you doing with your depression since your last visit? Unsure   How are you doing with your anxiety since your last visit?  Unsure   Are you having other symptoms that might be associated with depression or " anxiety? Yes:     Have you had a significant life event? No   Do you have any concerns with your use of alcohol or other drugs? No    Keeping busy reading, puzzles on her tablet, regular puzzles, game time with others from her building.    Walks, does not drive.  Feels weather has been awful.  Mood is ok but gets anxious if has multiple things she needs to do outside of apartment that week (appt, etc.).  Notes needs to get her hearing aids adjusted, so not liking bigger groups of people right now.  Has 4 grandkids - and a 5th on the way.  Social History     Tobacco Use    Smoking status: Never    Smokeless tobacco: Never   Vaping Use    Vaping status: Never Used   Substance Use Topics    Alcohol use: No     Comment: recovering alcoholic since 1985    Drug use: No         10/12/2023     1:26 PM 5/2/2024    12:59 PM 2/6/2025    12:31 PM   PHQ   PHQ-9 Total Score 5 4 1    Q9: Thoughts of better off dead/self-harm past 2 weeks Not at all Not at all Not at all       Patient-reported         6/29/2022     9:26 AM 10/12/2023     1:28 PM 2/6/2025    12:54 PM   MARYCRUZ-7 SCORE   Total Score  1 (minimal anxiety) 2 (minimal anxiety)   Total Score 1 1 2        Patient-reported         2/6/2025    12:31 PM   Last PHQ-9   1.  Little interest or pleasure in doing things 0   2.  Feeling down, depressed, or hopeless 0   3.  Trouble falling or staying asleep, or sleeping too much 1   4.  Feeling tired or having little energy 0   5.  Poor appetite or overeating 0   6.  Feeling bad about yourself 0   7.  Trouble concentrating 0   8.  Moving slowly or restless 0   Q9: Thoughts of better off dead/self-harm past 2 weeks 0   PHQ-9 Total Score 1        Patient-reported         2/6/2025    12:54 PM   MARYCRUZ-7    1. Feeling nervous, anxious, or on edge 0   2. Not being able to stop or control worrying 0   3. Worrying too much about different things 1   4. Trouble relaxing 0   5. Being so restless that it is hard to sit still 0   6. Becoming easily  annoyed or irritable 1   7. Feeling afraid, as if something awful might happen 0   MARYCRUZ-7 Total Score 2    If you checked any problems, how difficult have they made it for you to do your work, take care of things at home, or get along with other people? Not difficult at all       Patient-reported     Health Care Directive  Patient does not have a Health Care Directive: Patient states has Advance Directive and will bring in a copy to clinic. -- has been working on it         2/6/2025   General Health   How would you rate your overall physical health? Good   Feel stress (tense, anxious, or unable to sleep) Not at all         2/6/2025   Nutrition   Diet: Regular (no restrictions)         2/6/2025   Exercise   Days per week of moderate/strenous exercise 7 days   Average minutes spent exercising at this level 30 min         2/6/2025   Social Factors   Frequency of gathering with friends or relatives More than three times a week   Worry food won't last until get money to buy more No   Food not last or not have enough money for food? No   Do you have housing? (Housing is defined as stable permanent housing and does not include staying ouside in a car, in a tent, in an abandoned building, in an overnight shelter, or couch-surfing.) Yes   Are you worried about losing your housing? No   Lack of transportation? No   Unable to get utilities (heat,electricity)? No         2/6/2025   Fall Risk   Fallen 2 or more times in the past year? No    Trouble with walking or balance? No        Proxy-reported          2/6/2025   Activities of Daily Living- Home Safety   Needs help with the following daily activites None of the above   Safety concerns in the home None of the above         2/6/2025   Dental   Dentist two times every year? (!) NO         2/6/2025   Hearing Screening   Hearing concerns? (!) I FEEL THAT PEOPLE ARE MUMBLING OR NOT SPEAKING CLEARLY.    (!) I NEED TO ASK PEOPLE TO SPEAK UP OR REPEAT THEMSELVES.       Multiple values  from one day are sorted in reverse-chronological order         2/6/2025   Driving Risk Screening   Patient/family members have concerns about driving No         2/6/2025   General Alertness/Fatigue Screening   Have you been more tired than usual lately? No         2/6/2025   Urinary Incontinence Screening   Bothered by leaking urine in past 6 months No       Today's PHQ-9 Score:       2/6/2025    12:31 PM   PHQ-9 SCORE   PHQ-9 Total Score MyChart 1 (Minimal depression)   PHQ-9 Total Score 1        Patient-reported         2/6/2025   Substance Use   Alcohol more than 3/day or more than 7/wk No   Do you have a current opioid prescription? No   How severe/bad is pain from 1 to 10? 0/10 (No Pain)   Do you use any other substances recreationally? No     Social History     Tobacco Use    Smoking status: Never    Smokeless tobacco: Never   Vaping Use    Vaping status: Never Used   Substance Use Topics    Alcohol use: No     Comment: recovering alcoholic since 1985    Drug use: No       ASCVD Risk   The 10-year ASCVD risk score (Melvin DICKSON, et al., 2019) is: 27.8%    Values used to calculate the score:      Age: 79 years      Sex: Female      Is Non- : No      Diabetic: No      Tobacco smoker: No      Systolic Blood Pressure: 135 mmHg      Is BP treated: Yes      HDL Cholesterol: 47 mg/dL      Total Cholesterol: 231 mg/dL      Reviewed and updated as needed this visit by Provider                    Current providers sharing in care for this patient include:  Patient Care Team:  Susana Taylor PA-C as PCP - General (Physician Assistant)  Susana Taylor PA-C as Assigned PCP  Ananth Loredo AuD as Audiologist (Audiology)    The following health maintenance items are reviewed in Epic and correct as of today:  Health Maintenance   Topic Date Due    DEXA  Never done    ZOSTER IMMUNIZATION (1 of 2) Never done    MEDICARE ANNUAL WELLNESS VISIT  Never done    DTAP/TDAP/TD  "IMMUNIZATION (2 - Td or Tdap) 06/29/2019    RSV VACCINE (1 - 1-dose 75+ series) Never done    LIPID  06/29/2023    INFLUENZA VACCINE (1) 09/01/2024    COVID-19 Vaccine (1 - 2024-25 season) Never done    ANNUAL REVIEW OF HM ORDERS  10/12/2024    BMP  04/06/2025    PHQ-9  08/06/2025    FALL RISK ASSESSMENT  02/06/2026    GLUCOSE  04/06/2027    ADVANCE CARE PLANNING  10/12/2028    DEPRESSION ACTION PLAN  Completed    Pneumococcal Vaccine: 50+ Years  Completed    HPV IMMUNIZATION  Aged Out    MENINGITIS IMMUNIZATION  Aged Out    HEPATITIS C SCREENING  Discontinued    MAMMO SCREENING  Discontinued        Objective    Exam  /70   Pulse 76   Temp 98.3  F (36.8  C)   Resp 18   Ht 1.575 m (5' 2.01\")   Wt 59 kg (130 lb)   SpO2 96%   BMI 23.77 kg/m     Estimated body mass index is 23.77 kg/m  as calculated from the following:    Height as of this encounter: 1.575 m (5' 2.01\").    Weight as of this encounter: 59 kg (130 lb).    Physical Exam  GENERAL: alert and no distress  PSYCH: mentation appears normal, affect normal/bright        2/6/2025   Mini Cog   Clock Draw Score 2 Normal   3 Item Recall 2 objects recalled   Mini Cog Total Score 4              Signed Electronically by: Susana Taylor PA-C    Answers submitted by the patient for this visit:  Patient Health Questionnaire (Submitted on 2/6/2025)  If you checked off any problems, how difficult have these problems made it for you to do your work, take care of things at home, or get along with other people?: Not difficult at all  PHQ9 TOTAL SCORE: 1  Patient Health Questionnaire (G7) (Submitted on 2/6/2025)  MARYCRUZ 7 TOTAL SCORE: 2    "